# Patient Record
Sex: MALE | Race: WHITE | NOT HISPANIC OR LATINO
[De-identification: names, ages, dates, MRNs, and addresses within clinical notes are randomized per-mention and may not be internally consistent; named-entity substitution may affect disease eponyms.]

---

## 2020-02-10 ENCOUNTER — APPOINTMENT (OUTPATIENT)
Dept: OPHTHALMOLOGY | Facility: CLINIC | Age: 70
End: 2020-02-10
Payer: MEDICARE

## 2020-02-10 ENCOUNTER — NON-APPOINTMENT (OUTPATIENT)
Age: 70
End: 2020-02-10

## 2020-02-10 PROCEDURE — 92012 INTRM OPH EXAM EST PATIENT: CPT

## 2020-02-10 PROCEDURE — 92083 EXTENDED VISUAL FIELD XM: CPT

## 2020-02-10 PROCEDURE — 92015 DETERMINE REFRACTIVE STATE: CPT

## 2020-06-16 ENCOUNTER — NON-APPOINTMENT (OUTPATIENT)
Age: 70
End: 2020-06-16

## 2020-06-16 ENCOUNTER — APPOINTMENT (OUTPATIENT)
Dept: OPHTHALMOLOGY | Facility: CLINIC | Age: 70
End: 2020-06-16
Payer: MEDICARE

## 2020-06-16 PROCEDURE — 92014 COMPRE OPH EXAM EST PT 1/>: CPT

## 2020-06-16 PROCEDURE — 92202 OPSCPY EXTND ON/MAC DRAW: CPT

## 2020-06-16 PROCEDURE — 92133 CPTRZD OPH DX IMG PST SGM ON: CPT

## 2020-12-18 ENCOUNTER — NON-APPOINTMENT (OUTPATIENT)
Age: 70
End: 2020-12-18

## 2020-12-18 ENCOUNTER — APPOINTMENT (OUTPATIENT)
Dept: OPHTHALMOLOGY | Facility: CLINIC | Age: 70
End: 2020-12-18
Payer: MEDICARE

## 2020-12-18 PROCEDURE — 92012 INTRM OPH EXAM EST PATIENT: CPT

## 2020-12-18 PROCEDURE — 92083 EXTENDED VISUAL FIELD XM: CPT

## 2021-06-18 ENCOUNTER — APPOINTMENT (OUTPATIENT)
Dept: OPHTHALMOLOGY | Facility: CLINIC | Age: 71
End: 2021-06-18
Payer: MEDICARE

## 2021-06-18 ENCOUNTER — NON-APPOINTMENT (OUTPATIENT)
Age: 71
End: 2021-06-18

## 2021-06-18 PROCEDURE — 92014 COMPRE OPH EXAM EST PT 1/>: CPT

## 2021-06-18 PROCEDURE — 92133 CPTRZD OPH DX IMG PST SGM ON: CPT

## 2021-06-18 PROCEDURE — 92202 OPSCPY EXTND ON/MAC DRAW: CPT

## 2022-01-14 ENCOUNTER — NON-APPOINTMENT (OUTPATIENT)
Age: 72
End: 2022-01-14

## 2022-01-14 ENCOUNTER — APPOINTMENT (OUTPATIENT)
Dept: OPHTHALMOLOGY | Facility: CLINIC | Age: 72
End: 2022-01-14
Payer: MEDICARE

## 2022-01-14 PROCEDURE — 92250 FUNDUS PHOTOGRAPHY W/I&R: CPT

## 2022-01-14 PROCEDURE — 92083 EXTENDED VISUAL FIELD XM: CPT

## 2022-01-14 PROCEDURE — 92014 COMPRE OPH EXAM EST PT 1/>: CPT

## 2022-07-15 ENCOUNTER — NON-APPOINTMENT (OUTPATIENT)
Age: 72
End: 2022-07-15

## 2022-07-15 ENCOUNTER — APPOINTMENT (OUTPATIENT)
Dept: OPHTHALMOLOGY | Facility: CLINIC | Age: 72
End: 2022-07-15

## 2022-07-15 PROCEDURE — 92202 OPSCPY EXTND ON/MAC DRAW: CPT

## 2022-07-15 PROCEDURE — 92133 CPTRZD OPH DX IMG PST SGM ON: CPT

## 2022-07-15 PROCEDURE — 92015 DETERMINE REFRACTIVE STATE: CPT

## 2022-07-15 PROCEDURE — 92014 COMPRE OPH EXAM EST PT 1/>: CPT

## 2022-08-26 ENCOUNTER — APPOINTMENT (OUTPATIENT)
Dept: ORTHOPEDIC SURGERY | Facility: CLINIC | Age: 72
End: 2022-08-26

## 2023-01-20 ENCOUNTER — NON-APPOINTMENT (OUTPATIENT)
Age: 73
End: 2023-01-20

## 2023-01-20 ENCOUNTER — APPOINTMENT (OUTPATIENT)
Dept: OPHTHALMOLOGY | Facility: CLINIC | Age: 73
End: 2023-01-20
Payer: MEDICARE

## 2023-01-20 PROCEDURE — 92083 EXTENDED VISUAL FIELD XM: CPT

## 2023-01-20 PROCEDURE — 92134 CPTRZ OPH DX IMG PST SGM RTA: CPT

## 2023-01-20 PROCEDURE — 92012 INTRM OPH EXAM EST PATIENT: CPT

## 2023-06-24 ENCOUNTER — NON-APPOINTMENT (OUTPATIENT)
Age: 73
End: 2023-06-24

## 2023-06-28 ENCOUNTER — APPOINTMENT (OUTPATIENT)
Dept: ORTHOPEDIC SURGERY | Facility: CLINIC | Age: 73
End: 2023-06-28
Payer: MEDICARE

## 2023-06-28 VITALS — HEIGHT: 69 IN | BODY MASS INDEX: 32.44 KG/M2 | WEIGHT: 219 LBS

## 2023-06-28 DIAGNOSIS — M17.11 UNILATERAL PRIMARY OSTEOARTHRITIS, RIGHT KNEE: ICD-10-CM

## 2023-06-28 PROCEDURE — 99204 OFFICE O/P NEW MOD 45 MIN: CPT

## 2023-06-28 PROCEDURE — 73564 X-RAY EXAM KNEE 4 OR MORE: CPT | Mod: RT

## 2023-07-03 NOTE — ADDENDUM
[FreeTextEntry1] : I, Hiral Saha, acted solely as a scribe for Dr. Zafar Gimenez MD on this date 06/28/2023 .\par \par All medical record entries made by the Scribe were at my, Dr. Zafar Gimenez MD , direction and personally dictated by me on 06/28/2023 . I have reviewed the chart and agree that the record accurately reflects my personal performance of the history, physical exam, assessment and plan. I have also personally directed, reviewed, and agreed with the chart.\par

## 2023-07-03 NOTE — CONSULT LETTER
[Dear  ___] : Dear  [unfilled], [Sincerely,] : Sincerely, [FreeTextEntry3] : Dr. Bertin Gimenez\par [facility]\par [address]\[place] [address] \par [phone]\par

## 2023-07-03 NOTE — HISTORY OF PRESENT ILLNESS
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 Spontaneous onset 1 year ago diffused popliteal swelling and tightness and anterior peripatellar pain. Provocation of pain with walking. Notes occasional intermittent swelling and giving out. Patient presents today ambulating independently. Patient reports they are going away early July and then again in late August\par \par Of note, surgical history of meniscus operation in 2009/2010. \par

## 2023-07-03 NOTE — PHYSICAL EXAM
[de-identified] : Constitutional: Well nourished , well developed and in no acute distress\par Psychiatric: Alert and oriented to time place and person.Appropriate affect .\par Skin: Head, neck, arms and lower extremities:no lesions or discoloration\par HEENT: Normocephalic, EOM intact, Nasal septum midline,\par Respiratory: Unlabored respirations,no audible wheezing ,no tachypnea, no cyanosis\par Cardiovascular: No leg swelling no ankle edema no JVD, pulse regular\par Vascular: No calf or thigh tenderness,\par Peripheral pulses: intact\par Lymphatics: No groin adenopathy,no lymphedema lower or upper extremities\par \par Right Knee Exam:\par Inspection/Palpation : no tenderness to palpation, effusion 1+, no deformity\par Range of Motion : 5 - 115 degrees, crepitus, \par Stability : no valgus or varus instability present on provocative testing, Lachman’s Test (-)\par Motor Strength: Peroneals, EHL, and tibialis anterior 5/5\par Reflexes: Patella 2+ R=L, Achilles 2+ R=L\par Sensation : sensation to pin intact\par Vascular Exam : no edema, no cyanosis, dorsalis pedis artery pulse 1+, posterior tibial artery pulse 2+\par Skin : no erythema, no ecchymosis\par Additional tests: Right Hip Range of motion  satisfactory pain free [de-identified] : 4 views of the right  knee obtained today 06/28/2023 demonstrates degenerative narrowing medial compartment bone on bone opposition, subchondral sclerosis, valgus alignment

## 2023-07-03 NOTE — DISCUSSION/SUMMARY
[de-identified] : 72 year old presents with end stage RIGHT knee osteoarthritis with debilitating RIGHT knee pain that is unresponsive to comprehensive conservative management and compromising quality of life. The conditions and treatment options have been discussed with the patient. Given Xrays, failure of prior conservative treatment including physical therapy, NSAIDs, cortisone injections and persistent pain at rest, the patient understands that I recommend total knee replacement as their best option for long term pain relief. We discussed total knee replacement, including risks, benefits and alternatives, and patient would like to proceed with surgery. I reviewed the plan of care as well as a model of a knee implant equivalent to the one that will be used for their total knee joint replacement. The patient understands the risks and agrees to the plan of care as well as the use of implants for their total knee replacement.\par \par Patient will be scheduled for RIGHT total knee replacement.\par \par Dr. Zafar Gimenez MD

## 2023-07-19 ENCOUNTER — APPOINTMENT (OUTPATIENT)
Dept: INTERNAL MEDICINE | Facility: CLINIC | Age: 73
End: 2023-07-19
Payer: MEDICARE

## 2023-07-19 ENCOUNTER — NON-APPOINTMENT (OUTPATIENT)
Age: 73
End: 2023-07-19

## 2023-07-19 VITALS — DIASTOLIC BLOOD PRESSURE: 78 MMHG | SYSTOLIC BLOOD PRESSURE: 128 MMHG

## 2023-07-19 VITALS
HEIGHT: 69 IN | WEIGHT: 223 LBS | DIASTOLIC BLOOD PRESSURE: 80 MMHG | OXYGEN SATURATION: 97 % | HEART RATE: 90 BPM | BODY MASS INDEX: 33.03 KG/M2 | SYSTOLIC BLOOD PRESSURE: 153 MMHG

## 2023-07-19 DIAGNOSIS — R61 GENERALIZED HYPERHIDROSIS: ICD-10-CM

## 2023-07-19 DIAGNOSIS — Z00.00 ENCOUNTER FOR GENERAL ADULT MEDICAL EXAMINATION W/OUT ABNORMAL FINDINGS: ICD-10-CM

## 2023-07-19 PROCEDURE — 36415 COLL VENOUS BLD VENIPUNCTURE: CPT

## 2023-07-19 PROCEDURE — G0444 DEPRESSION SCREEN ANNUAL: CPT | Mod: 59

## 2023-07-19 PROCEDURE — G0439: CPT

## 2023-07-19 NOTE — PHYSICAL EXAM
[No Acute Distress] : no acute distress [Well Nourished] : well nourished [PERRL] : pupils equal round and reactive to light [EOMI] : extraocular movements intact [Normal Oropharynx] : the oropharynx was normal [Supple] : supple [No Respiratory Distress] : no respiratory distress  [Clear to Auscultation] : lungs were clear to auscultation bilaterally [Normal S1, S2] : normal S1 and S2 [No Murmur] : no murmur heard [No Edema] : there was no peripheral edema [Soft] : abdomen soft [Non Tender] : non-tender [Normal Bowel Sounds] : normal bowel sounds [No Rash] : no rash [No Focal Deficits] : no focal deficits [de-identified] : obese [de-identified] : right knee swelling

## 2023-07-19 NOTE — HEALTH RISK ASSESSMENT
[Excellent] : ~his/her~  mood as  excellent [Yes] : Yes [2 - 3 times a week (3 pts)] : 2 - 3  times a week (3 points) [1 or 2 (0 pts)] : 1 or 2 (0 points) [Never (0 pts)] : Never (0 points) [No] : In the past 12 months have you used drugs other than those required for medical reasons? No [No falls in past year] : Patient reported no falls in the past year [0] : 2) Feeling down, depressed, or hopeless: Not at all (0) [Patient reported colonoscopy was normal] : Patient reported colonoscopy was normal [With Significant Other] : lives with significant other [Retired] : retired [] :  [Fully functional (bathing, dressing, toileting, transferring, walking, feeding)] : Fully functional (bathing, dressing, toileting, transferring, walking, feeding) [Fully functional (using the telephone, shopping, preparing meals, housekeeping, doing laundry, using] : Fully functional and needs no help or supervision to perform IADLs (using the telephone, shopping, preparing meals, housekeeping, doing laundry, using transportation, managing medications and managing finances) [Former] : Former [< 15 Years] : < 15 Years [de-identified] : riding bike daily  [HIV3Hpguw] : 0 [Change in mental status noted] : No change in mental status noted [Language] : denies difficulty with language [Behavior] : denies difficulty with behavior [Learning/Retaining New Information] : denies difficulty learning/retaining new information [Handling Complex Tasks] : denies difficulty handling complex tasks [Reasoning] : denies difficulty with reasoning [Spatial Ability and Orientation] : denies difficulty with spatial ability and orientation [High Risk Behavior] : no high risk behavior

## 2023-07-19 NOTE — REVIEW OF SYSTEMS
[Sore Throat] : sore throat [Cough] : cough [Joint Pain] : joint pain [Joint Stiffness] : joint stiffness [Negative] : Respiratory

## 2023-07-21 ENCOUNTER — APPOINTMENT (OUTPATIENT)
Dept: OPHTHALMOLOGY | Facility: CLINIC | Age: 73
End: 2023-07-21

## 2023-07-25 LAB
ALBUMIN SERPL ELPH-MCNC: 4.6 G/DL
ALP BLD-CCNC: 93 U/L
ALT SERPL-CCNC: 18 U/L
ANION GAP SERPL CALC-SCNC: 13 MMOL/L
AST SERPL-CCNC: 19 U/L
BILIRUB SERPL-MCNC: 0.2 MG/DL
BUN SERPL-MCNC: 19 MG/DL
CALCIUM SERPL-MCNC: 9.9 MG/DL
CHLORIDE SERPL-SCNC: 104 MMOL/L
CHOLEST SERPL-MCNC: 165 MG/DL
CO2 SERPL-SCNC: 24 MMOL/L
CREAT SERPL-MCNC: 0.88 MG/DL
EGFR: 92 ML/MIN/1.73M2
ESTIMATED AVERAGE GLUCOSE: 123 MG/DL
GLUCOSE SERPL-MCNC: 111 MG/DL
HBA1C MFR BLD HPLC: 5.9 %
HDLC SERPL-MCNC: 53 MG/DL
LDLC SERPL CALC-MCNC: 74 MG/DL
NONHDLC SERPL-MCNC: 111 MG/DL
POTASSIUM SERPL-SCNC: 4.1 MMOL/L
PROT SERPL-MCNC: 7.2 G/DL
SODIUM SERPL-SCNC: 140 MMOL/L
TRIGL SERPL-MCNC: 227 MG/DL
TSH SERPL-ACNC: 1.12 UIU/ML

## 2023-08-29 ENCOUNTER — OUTPATIENT (OUTPATIENT)
Dept: OUTPATIENT SERVICES | Facility: HOSPITAL | Age: 73
LOS: 1 days | End: 2023-08-29

## 2023-08-29 VITALS
OXYGEN SATURATION: 97 % | DIASTOLIC BLOOD PRESSURE: 85 MMHG | TEMPERATURE: 97 F | SYSTOLIC BLOOD PRESSURE: 172 MMHG | WEIGHT: 227.08 LBS | HEART RATE: 88 BPM | RESPIRATION RATE: 16 BRPM | HEIGHT: 68.5 IN

## 2023-08-29 DIAGNOSIS — M17.11 UNILATERAL PRIMARY OSTEOARTHRITIS, RIGHT KNEE: ICD-10-CM

## 2023-08-29 DIAGNOSIS — Z98.890 OTHER SPECIFIED POSTPROCEDURAL STATES: Chronic | ICD-10-CM

## 2023-08-29 DIAGNOSIS — Z98.1 ARTHRODESIS STATUS: Chronic | ICD-10-CM

## 2023-08-29 LAB
A1C WITH ESTIMATED AVERAGE GLUCOSE RESULT: 5.8 % — HIGH (ref 4–5.6)
ALBUMIN SERPL ELPH-MCNC: 4.7 G/DL — SIGNIFICANT CHANGE UP (ref 3.3–5)
ALP SERPL-CCNC: 82 U/L — SIGNIFICANT CHANGE UP (ref 30–120)
ALT FLD-CCNC: 47 U/L — SIGNIFICANT CHANGE UP (ref 10–60)
ANION GAP SERPL CALC-SCNC: 7 MMOL/L — SIGNIFICANT CHANGE UP (ref 5–17)
APTT BLD: 31.4 SEC — SIGNIFICANT CHANGE UP (ref 24.5–35.6)
AST SERPL-CCNC: 31 U/L — SIGNIFICANT CHANGE UP (ref 10–40)
BILIRUB SERPL-MCNC: 0.5 MG/DL — SIGNIFICANT CHANGE UP (ref 0.2–1.2)
BLD GP AB SCN SERPL QL: SIGNIFICANT CHANGE UP
BUN SERPL-MCNC: 22 MG/DL — SIGNIFICANT CHANGE UP (ref 7–23)
CALCIUM SERPL-MCNC: 9.5 MG/DL — SIGNIFICANT CHANGE UP (ref 8.4–10.5)
CHLORIDE SERPL-SCNC: 104 MMOL/L — SIGNIFICANT CHANGE UP (ref 96–108)
CO2 SERPL-SCNC: 29 MMOL/L — SIGNIFICANT CHANGE UP (ref 22–31)
CREAT SERPL-MCNC: 0.89 MG/DL — SIGNIFICANT CHANGE UP (ref 0.5–1.3)
EGFR: 91 ML/MIN/1.73M2 — SIGNIFICANT CHANGE UP
ESTIMATED AVERAGE GLUCOSE: 120 MG/DL — HIGH (ref 68–114)
GLUCOSE SERPL-MCNC: 90 MG/DL — SIGNIFICANT CHANGE UP (ref 70–99)
HCT VFR BLD CALC: 44.1 % — SIGNIFICANT CHANGE UP (ref 39–50)
HGB BLD-MCNC: 13.9 G/DL — SIGNIFICANT CHANGE UP (ref 13–17)
INR BLD: 0.99 RATIO — SIGNIFICANT CHANGE UP (ref 0.85–1.18)
MCHC RBC-ENTMCNC: 31.3 PG — SIGNIFICANT CHANGE UP (ref 27–34)
MCHC RBC-ENTMCNC: 31.5 GM/DL — LOW (ref 32–36)
MCV RBC AUTO: 99.3 FL — SIGNIFICANT CHANGE UP (ref 80–100)
MRSA PCR RESULT.: SIGNIFICANT CHANGE UP
NRBC # BLD: 0 /100 WBCS — SIGNIFICANT CHANGE UP (ref 0–0)
PLATELET # BLD AUTO: 290 K/UL — SIGNIFICANT CHANGE UP (ref 150–400)
POTASSIUM SERPL-MCNC: 4.3 MMOL/L — SIGNIFICANT CHANGE UP (ref 3.5–5.3)
POTASSIUM SERPL-SCNC: 4.3 MMOL/L — SIGNIFICANT CHANGE UP (ref 3.5–5.3)
PROT SERPL-MCNC: 8.1 G/DL — SIGNIFICANT CHANGE UP (ref 6–8.3)
PROTHROM AB SERPL-ACNC: 11.1 SEC — SIGNIFICANT CHANGE UP (ref 9.5–13)
RBC # BLD: 4.44 M/UL — SIGNIFICANT CHANGE UP (ref 4.2–5.8)
RBC # FLD: 12.6 % — SIGNIFICANT CHANGE UP (ref 10.3–14.5)
S AUREUS DNA NOSE QL NAA+PROBE: SIGNIFICANT CHANGE UP
SODIUM SERPL-SCNC: 140 MMOL/L — SIGNIFICANT CHANGE UP (ref 135–145)
WBC # BLD: 8.37 K/UL — SIGNIFICANT CHANGE UP (ref 3.8–10.5)
WBC # FLD AUTO: 8.37 K/UL — SIGNIFICANT CHANGE UP (ref 3.8–10.5)

## 2023-08-29 NOTE — H&P PST ADULT - COMMENTS
history of covid December 2021, mild symptoms and no treatment  denies any current cold or flu like symptoms, including fever, cough, sinus congestion, body aches or chills  lumbar fusion L5-S1, 1971 history of covid December 2021, mild symptoms and no treatment  denies any current cold or flu like symptoms, including fever, cough, sinus congestion, body aches or chills  lumbar fusion L5-S1, 1971  full upper and partial lower denture

## 2023-08-29 NOTE — H&P PST ADULT - NSICDXFAMILYHX_GEN_ALL_CORE_FT
FAMILY HISTORY:  Father  Still living? No  Family history of obesity, Age at diagnosis: Age Unknown  Family history of type 2 diabetes mellitus, Age at diagnosis: Age Unknown    Mother  Still living? No  Family history of breast cancer, Age at diagnosis: Age Unknown    Sibling  Still living? Yes, Estimated age: 81-90  Family history of colon cancer, Age at diagnosis: Age Unknown

## 2023-08-29 NOTE — H&P PST ADULT - MUSCULOSKELETAL
right knee/no joint erythema/no joint warmth/no calf tenderness/decreased ROM/decreased ROM due to pain/joint swelling/strength 5/5 bilateral upper extremities/decreased strength/abnormal gait details…

## 2023-08-29 NOTE — H&P PST ADULT - NSANTHOSAYNRD_GEN_A_CORE
neck 17.5 inches/No. MIRYAM screening performed.  STOP BANG Legend: 0-2 = LOW Risk; 3-4 = INTERMEDIATE Risk; 5-8 = HIGH Risk

## 2023-08-29 NOTE — H&P PST ADULT - HISTORY OF PRESENT ILLNESS
72 yo male presents with 1 year history of right knee pain. He was treated in the past with "gel shots" with only 2-4 days of relief. Pain now 5/10 at rest and increased to 10/10 with activity. Also reports swelling and stiffness. Pain relieved with withrest and tylenol. Now uses a cane and wears a knee brace for ambulation

## 2023-08-29 NOTE — H&P PST ADULT - NSICDXPASTMEDICALHX_GEN_ALL_CORE_FT
PAST MEDICAL HISTORY:  COVID-19 vaccine series completed     Dyslipidemia     Dyspnea on exertion     History of COVID-19     History of nocturia     Osteoarthritis     Primary osteoarthritis of right knee

## 2023-08-29 NOTE — H&P PST ADULT - NSICDXPASTSURGICALHX_GEN_ALL_CORE_FT
PAST SURGICAL HISTORY:  History of arthroscopy of left knee     History of arthroscopy of right knee     History of lumbar spinal fusion     History of repair of left rotator cuff

## 2023-08-29 NOTE — H&P PST ADULT - PROBLEM SELECTOR PLAN 1
right TKR. medical and cardiac clearances requested. obtain EKG, echo and stress reports. instructed to stop 1 week prior to surgery. instructed to stop turmeric, curcumin and glucosamine/chondroitin 1 week prior to surgery. gatorade and surgical wash instructions reviewed and verbalized understanding.

## 2023-08-30 PROBLEM — Z86.16 PERSONAL HISTORY OF COVID-19: Chronic | Status: ACTIVE | Noted: 2023-08-29

## 2023-08-30 PROBLEM — M19.90 UNSPECIFIED OSTEOARTHRITIS, UNSPECIFIED SITE: Chronic | Status: ACTIVE | Noted: 2023-08-29

## 2023-08-30 PROBLEM — R06.09 OTHER FORMS OF DYSPNEA: Chronic | Status: ACTIVE | Noted: 2023-08-29

## 2023-08-30 PROBLEM — Z92.29 PERSONAL HISTORY OF OTHER DRUG THERAPY: Chronic | Status: ACTIVE | Noted: 2023-08-29

## 2023-08-30 PROBLEM — E78.5 HYPERLIPIDEMIA, UNSPECIFIED: Chronic | Status: ACTIVE | Noted: 2023-08-29

## 2023-08-30 PROBLEM — Z87.898 PERSONAL HISTORY OF OTHER SPECIFIED CONDITIONS: Chronic | Status: ACTIVE | Noted: 2023-08-29

## 2023-08-30 PROBLEM — M17.11 UNILATERAL PRIMARY OSTEOARTHRITIS, RIGHT KNEE: Chronic | Status: ACTIVE | Noted: 2023-08-29

## 2023-08-31 ENCOUNTER — APPOINTMENT (OUTPATIENT)
Dept: INTERNAL MEDICINE | Facility: CLINIC | Age: 73
End: 2023-08-31
Payer: MEDICARE

## 2023-08-31 VITALS — SYSTOLIC BLOOD PRESSURE: 142 MMHG | DIASTOLIC BLOOD PRESSURE: 80 MMHG

## 2023-08-31 VITALS — BODY MASS INDEX: 32.88 KG/M2 | HEART RATE: 81 BPM | HEIGHT: 69 IN | WEIGHT: 222 LBS | OXYGEN SATURATION: 98 %

## 2023-08-31 DIAGNOSIS — Z01.818 ENCOUNTER FOR OTHER PREPROCEDURAL EXAMINATION: ICD-10-CM

## 2023-08-31 PROCEDURE — 99214 OFFICE O/P EST MOD 30 MIN: CPT

## 2023-08-31 NOTE — ASSESSMENT
[Patient Optimized for Surgery] : Patient optimized for surgery [No Further Testing Recommended] : no further testing recommended [FreeTextEntry4] : Pt has been medically optimized by cardiology PST including EKG in cardiologist's office are all stable

## 2023-08-31 NOTE — HISTORY OF PRESENT ILLNESS
[No Pertinent Pulmonary History] : no history of asthma, COPD, sleep apnea, or smoking [No Adverse Anesthesia Reaction] : no adverse anesthesia reaction in self or family member [Chronic Anticoagulation] : no chronic anticoagulation [Chronic Kidney Disease] : no chronic kidney disease [Diabetes] : no diabetes [(Patient denies any chest pain, claudication, dyspnea on exertion, orthopnea, palpitations or syncope)] : Patient denies any chest pain, claudication, dyspnea on exertion, orthopnea, palpitations or syncope [Moderate (4-6 METs)] : Moderate (4-6 METs) [FreeTextEntry1] : r knee replacement [FreeTextEntry2] : 9/15/2023 [FreeTextEntry3] : Dr Bertin Gimenez [FreeTextEntry4] : Pt has a history of primary OA of r knee. he will be going in for replacement on 9/15 [FreeTextEntry5] : h/o aortic valve insufficiency

## 2023-09-06 RX ORDER — OXYCODONE HYDROCHLORIDE 5 MG/1
5 TABLET ORAL
Refills: 0 | Status: DISCONTINUED | OUTPATIENT
Start: 2023-09-14 | End: 2023-09-16

## 2023-09-06 RX ORDER — ONDANSETRON 8 MG/1
4 TABLET, FILM COATED ORAL EVERY 6 HOURS
Refills: 0 | Status: DISCONTINUED | OUTPATIENT
Start: 2023-09-14 | End: 2023-09-16

## 2023-09-06 RX ORDER — CELECOXIB 200 MG/1
200 CAPSULE ORAL EVERY 12 HOURS
Refills: 0 | Status: DISCONTINUED | OUTPATIENT
Start: 2023-09-14 | End: 2023-09-16

## 2023-09-06 RX ORDER — SODIUM CHLORIDE 9 MG/ML
1000 INJECTION, SOLUTION INTRAVENOUS
Refills: 0 | Status: DISCONTINUED | OUTPATIENT
Start: 2023-09-14 | End: 2023-09-16

## 2023-09-06 RX ORDER — MAGNESIUM HYDROXIDE 400 MG/1
30 TABLET, CHEWABLE ORAL DAILY
Refills: 0 | Status: DISCONTINUED | OUTPATIENT
Start: 2023-09-14 | End: 2023-09-16

## 2023-09-06 RX ORDER — OXYCODONE HYDROCHLORIDE 5 MG/1
10 TABLET ORAL
Refills: 0 | Status: DISCONTINUED | OUTPATIENT
Start: 2023-09-14 | End: 2023-09-16

## 2023-09-06 RX ORDER — SODIUM CHLORIDE 9 MG/ML
500 INJECTION INTRAMUSCULAR; INTRAVENOUS; SUBCUTANEOUS ONCE
Refills: 0 | Status: COMPLETED | OUTPATIENT
Start: 2023-09-14 | End: 2023-09-14

## 2023-09-06 RX ORDER — ACETAMINOPHEN 500 MG
1000 TABLET ORAL EVERY 8 HOURS
Refills: 0 | Status: DISCONTINUED | OUTPATIENT
Start: 2023-09-14 | End: 2023-09-16

## 2023-09-06 RX ORDER — PANTOPRAZOLE SODIUM 20 MG/1
40 TABLET, DELAYED RELEASE ORAL
Refills: 0 | Status: DISCONTINUED | OUTPATIENT
Start: 2023-09-14 | End: 2023-09-16

## 2023-09-06 RX ORDER — SENNA PLUS 8.6 MG/1
2 TABLET ORAL AT BEDTIME
Refills: 0 | Status: DISCONTINUED | OUTPATIENT
Start: 2023-09-14 | End: 2023-09-16

## 2023-09-06 RX ORDER — POLYETHYLENE GLYCOL 3350 17 G/17G
17 POWDER, FOR SOLUTION ORAL AT BEDTIME
Refills: 0 | Status: DISCONTINUED | OUTPATIENT
Start: 2023-09-14 | End: 2023-09-16

## 2023-09-06 RX ORDER — HYDROMORPHONE HYDROCHLORIDE 2 MG/ML
0.5 INJECTION INTRAMUSCULAR; INTRAVENOUS; SUBCUTANEOUS
Refills: 0 | Status: DISCONTINUED | OUTPATIENT
Start: 2023-09-14 | End: 2023-09-16

## 2023-09-07 ENCOUNTER — OUTPATIENT (OUTPATIENT)
Dept: OUTPATIENT SERVICES | Facility: HOSPITAL | Age: 73
LOS: 1 days | End: 2023-09-07
Payer: MEDICARE

## 2023-09-07 ENCOUNTER — APPOINTMENT (OUTPATIENT)
Dept: CT IMAGING | Facility: CLINIC | Age: 73
End: 2023-09-07
Payer: MEDICARE

## 2023-09-07 DIAGNOSIS — Z98.890 OTHER SPECIFIED POSTPROCEDURAL STATES: Chronic | ICD-10-CM

## 2023-09-07 DIAGNOSIS — M17.11 UNILATERAL PRIMARY OSTEOARTHRITIS, RIGHT KNEE: ICD-10-CM

## 2023-09-07 DIAGNOSIS — Z98.1 ARTHRODESIS STATUS: Chronic | ICD-10-CM

## 2023-09-07 PROCEDURE — 73700 CT LOWER EXTREMITY W/O DYE: CPT

## 2023-09-07 PROCEDURE — 73700 CT LOWER EXTREMITY W/O DYE: CPT | Mod: 26,RT,MH

## 2023-09-08 ENCOUNTER — APPOINTMENT (OUTPATIENT)
Dept: ORTHOPEDIC SURGERY | Facility: CLINIC | Age: 73
End: 2023-09-08
Payer: MEDICARE

## 2023-09-08 DIAGNOSIS — M17.11 UNILATERAL PRIMARY OSTEOARTHRITIS, RIGHT KNEE: ICD-10-CM

## 2023-09-08 PROCEDURE — 99213 OFFICE O/P EST LOW 20 MIN: CPT

## 2023-09-08 NOTE — PHYSICAL EXAM
TRANSFER - OUT REPORT:     Verbal report given to: EDGAR RN. Report consisted of patient's Situation, Background, Assessment and   Recommendations(SBAR). Opportunity for questions and clarification was provided. Patient transported with a Registered Nurse. Patient transported to: holding area. [de-identified] : Constitutional: Well nourished , well developed and in no acute distress Psychiatric: Alert and oriented to time place and person.Appropriate affect . Skin: Head, neck, arms and lower extremities:no lesions or discoloration HEENT: Normocephalic, EOM intact, Nasal septum midline, Respiratory: Unlabored respirations,no audible wheezing ,no tachypnea, no cyanosis Cardiovascular: No leg swelling no ankle edema no JVD, pulse regular Vascular: No calf or thigh tenderness, Peripheral pulses: intact Lymphatics: No groin adenopathy,no lymphedema lower or upper extremities  Right Knee Exam :skin intact Inspection/Palpation : no tenderness to palpation, effusion 1+, no deformity Range of Motion : 5 - 115 degrees, crepitus,  Stability : no valgus or varus instability present on provocative testing, Lachman's Test (-) Motor Strength: Peroneals, EHL, and tibialis anterior 5/5 Reflexes: Patella 2+ R=L, Achilles 2+ R=L Sensation : sensation to pin intact Vascular Exam : no edema, no cyanosis, dorsalis pedis artery pulse 1+, posterior tibial artery pulse 2+ Skin : no erythema, no ecchymosis Additional tests: Right Hip Range of motion  satisfactory pain free [de-identified] : 4 views of the right  knee obtained today 06/28/2023 demonstrates degenerative narrowing medial compartment bone on bone opposition, subchondral sclerosis, valgus alignment

## 2023-09-08 NOTE — DISCUSSION/SUMMARY
[de-identified] : 72 year old presents with end stage RIGHT knee osteoarthritis with debilitating RIGHT knee pain that is unresponsive to comprehensive conservative management and compromising quality of life. The conditions and treatment options have been discussed with the patient. Given Xrays, failure of prior conservative treatment including physical therapy, NSAIDs, cortisone injections and persistent pain at rest, the patient understands that I recommend total knee replacement as their best option for long term pain relief. We discussed total knee replacement, including risks, benefits and alternatives, and patient would like to proceed with surgery. I reviewed the plan of care as well as a model of a knee implant equivalent to the one that will be used for their total knee joint replacement. The patient understands the risks and agrees to the plan of care as well as the use of implants for their total knee replacement.\par  \par  Patient will be scheduled for RIGHT total knee replacement.\par  \par  Dr. Zafar Gimenez MD

## 2023-09-08 NOTE — HISTORY OF PRESENT ILLNESS
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 Spontaneous onset 1 year ago diffused popliteal swelling and tightness and anterior peripatellar pain. Provocation of pain with walking. Notes occasional intermittent swelling and giving out. Patient presents today ambulating independently.  Of note, surgical history of meniscus operation in 2009/2010.  Follow-up skin check

## 2023-09-08 NOTE — ADDENDUM
[FreeTextEntry1] : I, Hiral Saha, acted solely as a scribe for Dr. Zafar Gimenez MD on this date 09/08/2023 .  All medical record entries made by the Scribe were at my, Dr. Zafar Gimenez MD , direction and personally dictated by me on 09/08/2023 . I have reviewed the chart and agree that the record accurately reflects my personal performance of the history, physical exam, assessment and plan. I have also personally directed, reviewed, and agreed with the chart.

## 2023-09-13 ENCOUNTER — TRANSCRIPTION ENCOUNTER (OUTPATIENT)
Age: 73
End: 2023-09-13

## 2023-09-14 ENCOUNTER — TRANSCRIPTION ENCOUNTER (OUTPATIENT)
Age: 73
End: 2023-09-14

## 2023-09-14 ENCOUNTER — APPOINTMENT (OUTPATIENT)
Dept: ORTHOPEDIC SURGERY | Facility: HOSPITAL | Age: 73
End: 2023-09-14

## 2023-09-14 ENCOUNTER — INPATIENT (INPATIENT)
Facility: HOSPITAL | Age: 73
LOS: 1 days | Discharge: HOME | DRG: 470 | End: 2023-09-16
Attending: ORTHOPAEDIC SURGERY | Admitting: ORTHOPAEDIC SURGERY
Payer: MEDICARE

## 2023-09-14 VITALS
SYSTOLIC BLOOD PRESSURE: 149 MMHG | OXYGEN SATURATION: 100 % | DIASTOLIC BLOOD PRESSURE: 69 MMHG | RESPIRATION RATE: 22 BRPM | WEIGHT: 213.85 LBS | HEART RATE: 78 BPM | HEIGHT: 69 IN

## 2023-09-14 DIAGNOSIS — M17.11 UNILATERAL PRIMARY OSTEOARTHRITIS, RIGHT KNEE: ICD-10-CM

## 2023-09-14 DIAGNOSIS — Z98.890 OTHER SPECIFIED POSTPROCEDURAL STATES: Chronic | ICD-10-CM

## 2023-09-14 DIAGNOSIS — Z98.1 ARTHRODESIS STATUS: Chronic | ICD-10-CM

## 2023-09-14 PROCEDURE — 27447 TOTAL KNEE ARTHROPLASTY: CPT | Mod: RT

## 2023-09-14 PROCEDURE — 73560 X-RAY EXAM OF KNEE 1 OR 2: CPT | Mod: 26,RT

## 2023-09-14 PROCEDURE — 99222 1ST HOSP IP/OBS MODERATE 55: CPT

## 2023-09-14 DEVICE — MAKO BONE PIN 4MM X 140MM: Type: IMPLANTABLE DEVICE | Status: FUNCTIONAL

## 2023-09-14 DEVICE — PATELLA TRIATHLON 40MM: Type: IMPLANTABLE DEVICE | Status: FUNCTIONAL

## 2023-09-14 DEVICE — COMP FEM CR CMNTLSS BEADED W/ PA SZ 8 RT: Type: IMPLANTABLE DEVICE | Status: FUNCTIONAL

## 2023-09-14 DEVICE — INSERT TIB BEARING CS X3 SZ 7 9MM: Type: IMPLANTABLE DEVICE | Status: FUNCTIONAL

## 2023-09-14 DEVICE — MAKO BONE PIN 4MM X 110MM: Type: IMPLANTABLE DEVICE | Status: FUNCTIONAL

## 2023-09-14 DEVICE — BASEPLATE TIB TRIATHLON TRITAN SZ 7: Type: IMPLANTABLE DEVICE | Status: FUNCTIONAL

## 2023-09-14 RX ORDER — MULTIVIT-MIN/FERROUS GLUCONATE 9 MG/15 ML
0 LIQUID (ML) ORAL
Refills: 0 | DISCHARGE

## 2023-09-14 RX ORDER — ACETAMINOPHEN 500 MG
1000 TABLET ORAL ONCE
Refills: 0 | Status: COMPLETED | OUTPATIENT
Start: 2023-09-14 | End: 2023-09-14

## 2023-09-14 RX ORDER — HYDROMORPHONE HYDROCHLORIDE 2 MG/ML
0.5 INJECTION INTRAMUSCULAR; INTRAVENOUS; SUBCUTANEOUS
Refills: 0 | Status: DISCONTINUED | OUTPATIENT
Start: 2023-09-14 | End: 2023-09-14

## 2023-09-14 RX ORDER — DEXAMETHASONE 0.5 MG/5ML
8 ELIXIR ORAL ONCE
Refills: 0 | Status: COMPLETED | OUTPATIENT
Start: 2023-09-15 | End: 2023-09-15

## 2023-09-14 RX ORDER — ASPIRIN/CALCIUM CARB/MAGNESIUM 324 MG
81 TABLET ORAL EVERY 12 HOURS
Refills: 0 | Status: DISCONTINUED | OUTPATIENT
Start: 2023-09-15 | End: 2023-09-16

## 2023-09-14 RX ORDER — MILK THISTLE 150 MG
1 CAPSULE ORAL
Refills: 0 | DISCHARGE

## 2023-09-14 RX ORDER — UBIDECARENONE 100 MG
1 CAPSULE ORAL
Refills: 0 | DISCHARGE

## 2023-09-14 RX ORDER — ATORVASTATIN CALCIUM 80 MG/1
20 TABLET, FILM COATED ORAL DAILY
Refills: 0 | Status: DISCONTINUED | OUTPATIENT
Start: 2023-09-14 | End: 2023-09-16

## 2023-09-14 RX ORDER — APREPITANT 80 MG/1
40 CAPSULE ORAL ONCE
Refills: 0 | Status: COMPLETED | OUTPATIENT
Start: 2023-09-14 | End: 2023-09-14

## 2023-09-14 RX ORDER — CEFAZOLIN SODIUM 1 G
2000 VIAL (EA) INJECTION EVERY 8 HOURS
Refills: 0 | Status: COMPLETED | OUTPATIENT
Start: 2023-09-14 | End: 2023-09-15

## 2023-09-14 RX ORDER — CHLORHEXIDINE GLUCONATE 213 G/1000ML
1 SOLUTION TOPICAL ONCE
Refills: 0 | Status: COMPLETED | OUTPATIENT
Start: 2023-09-14 | End: 2023-09-14

## 2023-09-14 RX ORDER — HYDROMORPHONE HYDROCHLORIDE 2 MG/ML
0.25 INJECTION INTRAMUSCULAR; INTRAVENOUS; SUBCUTANEOUS
Refills: 0 | Status: DISCONTINUED | OUTPATIENT
Start: 2023-09-14 | End: 2023-09-14

## 2023-09-14 RX ORDER — GLUCOSAMINE/CHONDROITIN/C/MANG 500-400 MG
3 CAPSULE ORAL
Refills: 0 | DISCHARGE

## 2023-09-14 RX ORDER — ONDANSETRON 8 MG/1
4 TABLET, FILM COATED ORAL ONCE
Refills: 0 | Status: DISCONTINUED | OUTPATIENT
Start: 2023-09-14 | End: 2023-09-14

## 2023-09-14 RX ORDER — CEFAZOLIN SODIUM 1 G
2000 VIAL (EA) INJECTION ONCE
Refills: 0 | Status: COMPLETED | OUTPATIENT
Start: 2023-09-14 | End: 2023-09-14

## 2023-09-14 RX ORDER — TRANEXAMIC ACID 100 MG/ML
1000 INJECTION, SOLUTION INTRAVENOUS ONCE
Refills: 0 | Status: COMPLETED | OUTPATIENT
Start: 2023-09-14 | End: 2023-09-14

## 2023-09-14 RX ORDER — SODIUM CHLORIDE 9 MG/ML
500 INJECTION INTRAMUSCULAR; INTRAVENOUS; SUBCUTANEOUS ONCE
Refills: 0 | Status: COMPLETED | OUTPATIENT
Start: 2023-09-14 | End: 2023-09-14

## 2023-09-14 RX ORDER — ATORVASTATIN CALCIUM 80 MG/1
1 TABLET, FILM COATED ORAL
Refills: 0 | DISCHARGE

## 2023-09-14 RX ORDER — INFLUENZA VIRUS VACCINE 15; 15; 15; 15 UG/.5ML; UG/.5ML; UG/.5ML; UG/.5ML
0.7 SUSPENSION INTRAMUSCULAR ONCE
Refills: 0 | Status: DISCONTINUED | OUTPATIENT
Start: 2023-09-14 | End: 2023-09-16

## 2023-09-14 RX ORDER — SODIUM CHLORIDE 9 MG/ML
1000 INJECTION, SOLUTION INTRAVENOUS
Refills: 0 | Status: DISCONTINUED | OUTPATIENT
Start: 2023-09-14 | End: 2023-09-14

## 2023-09-14 RX ADMIN — CELECOXIB 200 MILLIGRAM(S): 200 CAPSULE ORAL at 21:18

## 2023-09-14 RX ADMIN — Medication 1000 MILLIGRAM(S): at 22:55

## 2023-09-14 RX ADMIN — APREPITANT 40 MILLIGRAM(S): 80 CAPSULE ORAL at 08:48

## 2023-09-14 RX ADMIN — SODIUM CHLORIDE 500 MILLILITER(S): 9 INJECTION INTRAMUSCULAR; INTRAVENOUS; SUBCUTANEOUS at 18:10

## 2023-09-14 RX ADMIN — SENNA PLUS 2 TABLET(S): 8.6 TABLET ORAL at 21:17

## 2023-09-14 RX ADMIN — SODIUM CHLORIDE 500 MILLILITER(S): 9 INJECTION INTRAMUSCULAR; INTRAVENOUS; SUBCUTANEOUS at 16:33

## 2023-09-14 RX ADMIN — HYDROMORPHONE HYDROCHLORIDE 0.5 MILLIGRAM(S): 2 INJECTION INTRAMUSCULAR; INTRAVENOUS; SUBCUTANEOUS at 21:35

## 2023-09-14 RX ADMIN — Medication 100 MILLIGRAM(S): at 19:24

## 2023-09-14 RX ADMIN — Medication 400 MILLIGRAM(S): at 17:13

## 2023-09-14 RX ADMIN — HYDROMORPHONE HYDROCHLORIDE 0.5 MILLIGRAM(S): 2 INJECTION INTRAMUSCULAR; INTRAVENOUS; SUBCUTANEOUS at 18:10

## 2023-09-14 RX ADMIN — HYDROMORPHONE HYDROCHLORIDE 0.5 MILLIGRAM(S): 2 INJECTION INTRAMUSCULAR; INTRAVENOUS; SUBCUTANEOUS at 21:15

## 2023-09-14 RX ADMIN — OXYCODONE HYDROCHLORIDE 10 MILLIGRAM(S): 5 TABLET ORAL at 20:20

## 2023-09-14 RX ADMIN — ATORVASTATIN CALCIUM 20 MILLIGRAM(S): 80 TABLET, FILM COATED ORAL at 21:17

## 2023-09-14 RX ADMIN — Medication 1000 MILLIGRAM(S): at 17:13

## 2023-09-14 RX ADMIN — OXYCODONE HYDROCHLORIDE 10 MILLIGRAM(S): 5 TABLET ORAL at 23:38

## 2023-09-14 RX ADMIN — Medication 1000 MILLIGRAM(S): at 22:51

## 2023-09-14 RX ADMIN — SODIUM CHLORIDE 125 MILLILITER(S): 9 INJECTION, SOLUTION INTRAVENOUS at 19:24

## 2023-09-14 RX ADMIN — CELECOXIB 200 MILLIGRAM(S): 200 CAPSULE ORAL at 21:17

## 2023-09-14 RX ADMIN — OXYCODONE HYDROCHLORIDE 10 MILLIGRAM(S): 5 TABLET ORAL at 19:24

## 2023-09-14 RX ADMIN — OXYCODONE HYDROCHLORIDE 10 MILLIGRAM(S): 5 TABLET ORAL at 22:52

## 2023-09-14 RX ADMIN — CHLORHEXIDINE GLUCONATE 1 APPLICATION(S): 213 SOLUTION TOPICAL at 08:49

## 2023-09-14 RX ADMIN — HYDROMORPHONE HYDROCHLORIDE 0.5 MILLIGRAM(S): 2 INJECTION INTRAMUSCULAR; INTRAVENOUS; SUBCUTANEOUS at 18:11

## 2023-09-14 NOTE — PHYSICAL THERAPY INITIAL EVALUATION ADULT - GAIT TRAINING, PT EVAL
Patient will ambulate 150 feet independently with a rolling walker within 1-2 days for safe community ambulation. Patient will ascend/descend 5 stairs independently with +HR and straight cane within 1-2 days for safe household stair negotiation.

## 2023-09-14 NOTE — DISCHARGE NOTE PROVIDER - NSDCCPTREATMENT_GEN_ALL_CORE_FT
PRINCIPAL PROCEDURE  Procedure: Arthroplasty, knee, total, robot-assisted, using Isidro system  Findings and Treatment: right knee  osteoarthritis right knee

## 2023-09-14 NOTE — DISCHARGE NOTE PROVIDER - NSDCDCMDCOMP_GEN_ALL_CORE
Patient presents to clinic for annual PX and medication review.  Leah Morales LPN ....................  6/12/2018   8:23 AM    
This document is complete and the patient is ready for discharge.

## 2023-09-14 NOTE — PHYSICAL THERAPY INITIAL EVALUATION ADULT - ADDITIONAL COMMENTS
Pt lives with wife in a private home, there are 5 stairs +HR to enter and no stairs to navigate within. Pt has a tub shower. PTA pt was independent with ADLs and ambulation. Pt owns a RW.

## 2023-09-14 NOTE — DISCHARGE NOTE PROVIDER - NSDCFUSCHEDAPPT_GEN_ALL_CORE_FT
Bertin Gimenez  [facility]  ORTHOSURG [address]  Scheduled Appointment: 09/27/2023    Byron Joy  [facility]  OP[address]THALM [address]  Scheduled Appointment: 10/20/2023

## 2023-09-14 NOTE — DISCHARGE NOTE PROVIDER - INSTRUCTIONS
For Constipation :   • Increase your water intake. Drink at least 8 glasses of water daily.  • Try adding fiber to your diet by eating fruits, vegetables and foods that are rich in grains.  • If you do experience constipation, you may take an over-the-counter stool softener/laxative such as Terra Colace, Senekot, miralax or  Milk of Magnesia.

## 2023-09-14 NOTE — DISCHARGE NOTE PROVIDER - HOSPITAL COURSE
The patient is a 73  y.o.  male with severe osteoarthritis of the right knee.  He was evaluated by the PST dept at [facility] and obtained the appropriate medical clearances.  After admission on 9/14/23 and receiving pre-operative parenteral prophylactic antibiotics (ancef), the patient  underwent an  uncomplicated right TKA w/ SALUD guidance by orthopedic surgeon Dr. Bertin Gimenez.    A medical consultation from the Hospitalist service was obtained for post-operative medical co-management. Typical Physical & occupational therapy modalities post TKA were performed including ambulation training, range of motion, ADL's, and transfers. Ecotrin 81 mg every 12 hrs, along w/ bilat venodynes was given for DVT prophylaxis (caprini 8).  The patient had a clean appearing surgical incision with no sign of surgical site infections and had a stable neuro / vascular exam of the operated extremity.  After progression of mobility guided by the PT/ OT staff,  the patient was felt to benefit from further rehabilitative care for restoration to level of function. This was felt to best be accomplished at  home w/ home care (skilled RN/PT/OT).  Discharge and Orthopedic Care instructions were delineated in the Discharge Plan and reviewed with the patient. All medications were delineated in the medication reconciliation tool and key points were reviewed with the patient. They were deemed stable from an Orthopedic & medical standpoint for discharge today.  Upon completion of Home care  he will be  following up with Dr. Gimenez for office  follow up Orthopedic care.

## 2023-09-14 NOTE — DISCHARGE NOTE PROVIDER - PROVIDER TOKENS
PROVIDER:[TOKEN:[[id number]],SCHEDULEDAPPT:[09/27/2023],SCHEDULEDAPPTTIME:[08:30 AM],ESTABLISHEDPATIENT:[T]] 2 = assistive person

## 2023-09-14 NOTE — BRIEF OPERATIVE NOTE - NSICDXBRIEFPROCEDURE_GEN_ALL_CORE_FT
PROCEDURES:  Arthroplasty, knee, total, robot-assisted, using Isidro system 14-Sep-2023 14:24:02 right knee Radha Nichols

## 2023-09-14 NOTE — PATIENT PROFILE ADULT - FALL HARM RISK - UNIVERSAL INTERVENTIONS
Bed in lowest position, wheels locked, appropriate side rails in place/Call bell, personal items and telephone in reach/Instruct patient to call for assistance before getting out of bed or chair/Non-slip footwear when patient is out of bed/[place] to call system/Physically safe environment - no spills, clutter or unnecessary equipment/Purposeful Proactive Rounding/Room/bathroom lighting operational, light cord in reach

## 2023-09-14 NOTE — PHYSICAL THERAPY INITIAL EVALUATION ADULT - BALANCE DISTURBANCE, IDENTIFIED IMPAIRMENT CONTRIBUTE, REHAB EVAL
Patient is here today for irregular periods.  Her last pap showed: negative hpv negative done 7/17/2018             impaired motor control/decreased ROM/decreased strength

## 2023-09-14 NOTE — PHYSICAL THERAPY INITIAL EVALUATION ADULT - GAIT DEVIATIONS NOTED, PT EVAL
decreased jazmine/increased time in double stance/decreased step length/decreased weight-shifting ability

## 2023-09-14 NOTE — DISCHARGE NOTE PROVIDER - NSDCCPCAREPLAN_GEN_ALL_CORE_FT
PRINCIPAL DISCHARGE DIAGNOSIS  Diagnosis: Primary osteoarthritis of right knee  Assessment and Plan of Treatment: right TKA w/ SALUD guidance  Physical Therapy/Occupational Therapy for: ambulation, transfers, stairs, ADL's (activities of daily living), range of motion exercises, and isometrics  -Activity  • Weight Bearing as tolerated with rolling walker.  • Take short, frequent walks increasing the distance that you walk each day as tolerated.  • Change your position every hour to decrease pain and stiffness.  • Continue the exercises taught to you by your physical therapist.  • No driving until cleared by the doctor.  • No tub baths, hot tubs, or swimming pools until instructed by your doctor.  • Do not squat down on the floor.  • Do not kneel or twist your knee.  • Range of Motion Goals: Flexion= 120 degrees, Extension = 0 degrees  Keep incision clean and dry. You have a mepilex dressing. It is water resistant and may get slightly wet in the shower.  Remove dressing in 7 days and leave wound open to air.  Wound may get wet in the shower as long as there is no drainage from wound.  Suture removal 2 weeks after surgery at Surgeon's office.  Use cryocuff for 20 minute sessions w/ at least 1 hr between sessions.  Use a towel or washcloth under cuff; don't place directly on the skin.  Opioid safety :  Do not keep opioid in the medicine cabinet in bathroom where others may have access to it.  Do not drive while taking opioid.  To dispose of it some pharmacies do have drop boxes as do police stations.  Do not flush or put in trash.

## 2023-09-14 NOTE — CONSULT NOTE ADULT - ASSESSMENT
74 yo male, pmh of hld, OA, presenting for right knee replacement  - pain control, dvt ppx as per ortho  - bowel regimen  - ivf  - pt/ot  - monitor bp  - continue home statin  - will follow

## 2023-09-14 NOTE — DISCHARGE NOTE PROVIDER - NSDCMRMEDTOKEN_GEN_ALL_CORE_FT
atorvastatin 20 mg oral tablet: 1 orally once a day  CoQ10 100 mg oral capsule: 1 orally once a day  Glucosamine Chondroitin oral capsule: 3 orally 2 times a day  PreserVision AREDS 2 oral capsule: orally 2 times a day  turmeric 500 mg oral capsule: 1 orally 2 times a day  Tylenol 500 mg oral tablet: 2 orally 2 times a day as needed for  severe pain   acetaminophen 500 mg oral tablet: 2 tab(s) orally every 8 hours  aspirin 81 mg oral delayed release tablet: 1 tab(s) orally every 12 hours take 2 hrs before celebrex  atorvastatin 20 mg oral tablet: 1 orally once a day  celecoxib 200 mg oral capsule: 1 cap(s) orally every 12 hours take 2 hrs after ecotrin  CoQ10 100 mg oral capsule: 1 orally once a day  Glucosamine Chondroitin oral capsule: 3 orally 2 times a day  omeprazole 20 mg oral delayed release capsule: 1 cap(s) orally once a day before breakfast  oxyCODONE 5 mg oral tablet: 1 tab(s) orally every 4 hours as needed for  moderate pain or 2 tabs every 4 hrs as needed for severe pain   [id number] MDD: 6  polyethylene glycol 3350 oral powder for reconstitution: 17 gram(s) orally once a day (at bedtime)  PreserVision AREDS 2 oral capsule: orally 2 times a day  senna leaf extract oral tablet: 2 tab(s) orally once a day (at bedtime)  turmeric 500 mg oral capsule: 1 orally 2 times a day

## 2023-09-14 NOTE — DISCHARGE NOTE PROVIDER - CARE PROVIDER_API CALL
Bertin Gimenez  Orthopaedic Surgery  [address], [address]  [place] [address]  Phone: [phone]  Fax: [phone]  Established Patient  Scheduled Appointment: 09/27/2023 08:30 AM

## 2023-09-14 NOTE — CONSULT NOTE ADULT - SUBJECTIVE AND OBJECTIVE BOX
HPI:  74 yo male, pmh of hld, OA, presenting for right knee TKR. Denies current pain in pacu, still not feeling or able to feel feet due to spinal anesthesia. Denies dizziness, fever, chills, nausea, vomiting, headaches.    PAST MEDICAL & SURGICAL HISTORY:  Osteoarthritis      COVID-19 vaccine series completed      Primary osteoarthritis of right knee      Dyslipidemia      History of COVID-19      Dyspnea on exertion      History of nocturia      History of lumbar spinal fusion      History of repair of left rotator cuff      History of arthroscopy of right knee      History of arthroscopy of left knee          ANTIMICROBIAL:    CARDIOVASCULAR:    PULMONARY:    NEUROLOGIC:    ONCOLOGIC:    HEMATOLOGIC:    GATROINTESTINAL:     MEDS:    ENDO/METABOLIC:    IV FLUID/NUTRITION:  sodium chloride 0.9% Bolus 500 milliLiter(s) IV Bolus once    TOPICAL:    IMMUNOLOGIC & OTHER  influenza  Vaccine (HIGH DOSE) 0.7 milliLiter(s) IntraMuscular once        Allergies    shellfish (Anaphylaxis)  No Known Drug Allergies  fish (Anaphylaxis)    Intolerances      PAST MEDICAL & SURGICAL HISTORY:  Osteoarthritis      COVID-19 vaccine series completed      Primary osteoarthritis of right knee      Dyslipidemia      History of COVID-19      Dyspnea on exertion      History of nocturia      History of lumbar spinal fusion      History of repair of left rotator cuff      History of arthroscopy of right knee      History of arthroscopy of left knee        SOCIAL HISTORY:    FAMILY HISTORY:  Family history of type 2 diabetes mellitus (Father)    Family history of obesity (Father)    Family history of breast cancer (Mother)    Family history of colon cancer (Sibling)        Vital Signs Last 24 Hrs  T(C): 36.8 (14 Sep 2023 14:23), Max: 36.8 (14 Sep 2023 14:23)  T(F): 98.2 (14 Sep 2023 14:23), Max: 98.2 (14 Sep 2023 14:23)  HR: 76 (14 Sep 2023 14:53) (75 - 78)  BP: 135/69 (14 Sep 2023 14:53) (130/58 - 149/69)  BP(mean): --  RR: 15 (14 Sep 2023 14:53) (14 - 22)  SpO2: 95% (14 Sep 2023 14:53) (95% - 100%)    Parameters below as of 14 Sep 2023 14:53    O2 Flow (L/min): 3        I&O's Detail    Daily Height in cm: 175.26 (14 Sep 2023 07:57)    Daily     REVIEW OF SYSTEMS:    as per hpi, other systems reviewed and are negative    PHYSICAL EXAM:    GENERAL: NAD, older male  HEAD:  Atraumatic, Normocephalic  EYES: EOMI, PERRLA, conjunctiva and sclera clear  ENMT: No tonsillar erythema, exudates, or enlargement; Moist mucous membranes, Good dentition, No lesions  NECK: Supple, No JVD,   NERVOUS SYSTEM:  Alert & Oriented X3, no focal deficits  CHEST/LUNG: Clear to percussion bilaterally; No rales, rhonchi, wheezing, or rubs  HEART: Regular rate and rhythm; No murmurs, rubs, or gallops  ABDOMEN: Soft, Nontender, Nondistended; Bowel sounds present  EXTREMITIES:  1+ Peripheral Pulses, Right knee wrapped in ace bandage  LYMPH: No lymphadenopathy   SKIN: No rashes or lesions      LABS:                      RADIOLOGY & ADDITIONAL STUDIES:

## 2023-09-15 ENCOUNTER — TRANSCRIPTION ENCOUNTER (OUTPATIENT)
Age: 73
End: 2023-09-15

## 2023-09-15 LAB
ANION GAP SERPL CALC-SCNC: 11 MMOL/L — SIGNIFICANT CHANGE UP (ref 5–17)
BUN SERPL-MCNC: 18 MG/DL — SIGNIFICANT CHANGE UP (ref 7–23)
CALCIUM SERPL-MCNC: 9.5 MG/DL — SIGNIFICANT CHANGE UP (ref 8.4–10.5)
CHLORIDE SERPL-SCNC: 103 MMOL/L — SIGNIFICANT CHANGE UP (ref 96–108)
CO2 SERPL-SCNC: 22 MMOL/L — SIGNIFICANT CHANGE UP (ref 22–31)
CREAT SERPL-MCNC: 0.97 MG/DL — SIGNIFICANT CHANGE UP (ref 0.5–1.3)
EGFR: 83 ML/MIN/1.73M2 — SIGNIFICANT CHANGE UP
GLUCOSE SERPL-MCNC: 178 MG/DL — HIGH (ref 70–99)
HCT VFR BLD CALC: 33.4 % — LOW (ref 39–50)
HGB BLD-MCNC: 10.8 G/DL — LOW (ref 13–17)
MCHC RBC-ENTMCNC: 32 PG — SIGNIFICANT CHANGE UP (ref 27–34)
MCHC RBC-ENTMCNC: 32.3 GM/DL — SIGNIFICANT CHANGE UP (ref 32–36)
MCV RBC AUTO: 98.8 FL — SIGNIFICANT CHANGE UP (ref 80–100)
NRBC # BLD: 0 /100 WBCS — SIGNIFICANT CHANGE UP (ref 0–0)
PLATELET # BLD AUTO: 274 K/UL — SIGNIFICANT CHANGE UP (ref 150–400)
POTASSIUM SERPL-MCNC: 4.3 MMOL/L — SIGNIFICANT CHANGE UP (ref 3.5–5.3)
POTASSIUM SERPL-SCNC: 4.3 MMOL/L — SIGNIFICANT CHANGE UP (ref 3.5–5.3)
RBC # BLD: 3.38 M/UL — LOW (ref 4.2–5.8)
RBC # FLD: 12 % — SIGNIFICANT CHANGE UP (ref 10.3–14.5)
SODIUM SERPL-SCNC: 136 MMOL/L — SIGNIFICANT CHANGE UP (ref 135–145)
WBC # BLD: 16.18 K/UL — HIGH (ref 3.8–10.5)
WBC # FLD AUTO: 16.18 K/UL — HIGH (ref 3.8–10.5)

## 2023-09-15 PROCEDURE — 99232 SBSQ HOSP IP/OBS MODERATE 35: CPT

## 2023-09-15 RX ORDER — OMEPRAZOLE 10 MG/1
1 CAPSULE, DELAYED RELEASE ORAL
Qty: 30 | Refills: 0
Start: 2023-09-15 | End: 2023-10-14

## 2023-09-15 RX ORDER — SENNA PLUS 8.6 MG/1
2 TABLET ORAL
Qty: 0 | Refills: 0 | DISCHARGE
Start: 2023-09-15

## 2023-09-15 RX ORDER — CELECOXIB 200 MG/1
1 CAPSULE ORAL
Qty: 60 | Refills: 0
Start: 2023-09-15 | End: 2023-10-14

## 2023-09-15 RX ORDER — ACETAMINOPHEN 500 MG
2 TABLET ORAL
Refills: 0 | DISCHARGE

## 2023-09-15 RX ORDER — ACETAMINOPHEN 500 MG
2 TABLET ORAL
Qty: 0 | Refills: 0 | DISCHARGE
Start: 2023-09-15

## 2023-09-15 RX ORDER — ASPIRIN/CALCIUM CARB/MAGNESIUM 324 MG
1 TABLET ORAL
Qty: 56 | Refills: 0
Start: 2023-09-15 | End: 2023-10-12

## 2023-09-15 RX ORDER — OXYCODONE HYDROCHLORIDE 5 MG/1
1 TABLET ORAL
Qty: 42 | Refills: 0
Start: 2023-09-15

## 2023-09-15 RX ORDER — CELECOXIB 100 MG/1
1 CAPSULE ORAL
Qty: 60 | Refills: 0 | DISCHARGE
Start: 2023-09-15 | End: 2023-10-14

## 2023-09-15 RX ORDER — POLYETHYLENE GLYCOL 3350 17 G/17G
17 POWDER, FOR SOLUTION ORAL
Qty: 0 | Refills: 0 | DISCHARGE
Start: 2023-09-15

## 2023-09-15 RX ADMIN — Medication 81 MILLIGRAM(S): at 18:25

## 2023-09-15 RX ADMIN — CELECOXIB 200 MILLIGRAM(S): 200 CAPSULE ORAL at 21:02

## 2023-09-15 RX ADMIN — Medication 100 MILLIGRAM(S): at 03:17

## 2023-09-15 RX ADMIN — Medication 81 MILLIGRAM(S): at 05:46

## 2023-09-15 RX ADMIN — CELECOXIB 200 MILLIGRAM(S): 200 CAPSULE ORAL at 21:30

## 2023-09-15 RX ADMIN — HYDROMORPHONE HYDROCHLORIDE 0.5 MILLIGRAM(S): 2 INJECTION INTRAMUSCULAR; INTRAVENOUS; SUBCUTANEOUS at 11:26

## 2023-09-15 RX ADMIN — OXYCODONE HYDROCHLORIDE 10 MILLIGRAM(S): 5 TABLET ORAL at 21:30

## 2023-09-15 RX ADMIN — Medication 1000 MILLIGRAM(S): at 05:48

## 2023-09-15 RX ADMIN — HYDROMORPHONE HYDROCHLORIDE 0.5 MILLIGRAM(S): 2 INJECTION INTRAMUSCULAR; INTRAVENOUS; SUBCUTANEOUS at 11:11

## 2023-09-15 RX ADMIN — OXYCODONE HYDROCHLORIDE 10 MILLIGRAM(S): 5 TABLET ORAL at 13:29

## 2023-09-15 RX ADMIN — HYDROMORPHONE HYDROCHLORIDE 0.5 MILLIGRAM(S): 2 INJECTION INTRAMUSCULAR; INTRAVENOUS; SUBCUTANEOUS at 05:45

## 2023-09-15 RX ADMIN — OXYCODONE HYDROCHLORIDE 10 MILLIGRAM(S): 5 TABLET ORAL at 03:23

## 2023-09-15 RX ADMIN — Medication 1000 MILLIGRAM(S): at 15:00

## 2023-09-15 RX ADMIN — POLYETHYLENE GLYCOL 3350 17 GRAM(S): 17 POWDER, FOR SOLUTION ORAL at 21:03

## 2023-09-15 RX ADMIN — OXYCODONE HYDROCHLORIDE 10 MILLIGRAM(S): 5 TABLET ORAL at 09:27

## 2023-09-15 RX ADMIN — OXYCODONE HYDROCHLORIDE 10 MILLIGRAM(S): 5 TABLET ORAL at 21:02

## 2023-09-15 RX ADMIN — ATORVASTATIN CALCIUM 20 MILLIGRAM(S): 80 TABLET, FILM COATED ORAL at 12:21

## 2023-09-15 RX ADMIN — OXYCODONE HYDROCHLORIDE 10 MILLIGRAM(S): 5 TABLET ORAL at 08:57

## 2023-09-15 RX ADMIN — Medication 101.6 MILLIGRAM(S): at 05:46

## 2023-09-15 RX ADMIN — Medication 1000 MILLIGRAM(S): at 05:46

## 2023-09-15 RX ADMIN — SODIUM CHLORIDE 125 MILLILITER(S): 9 INJECTION, SOLUTION INTRAVENOUS at 09:46

## 2023-09-15 RX ADMIN — OXYCODONE HYDROCHLORIDE 10 MILLIGRAM(S): 5 TABLET ORAL at 04:18

## 2023-09-15 RX ADMIN — CELECOXIB 200 MILLIGRAM(S): 200 CAPSULE ORAL at 10:13

## 2023-09-15 RX ADMIN — PANTOPRAZOLE SODIUM 40 MILLIGRAM(S): 20 TABLET, DELAYED RELEASE ORAL at 05:46

## 2023-09-15 RX ADMIN — OXYCODONE HYDROCHLORIDE 10 MILLIGRAM(S): 5 TABLET ORAL at 14:00

## 2023-09-15 RX ADMIN — HYDROMORPHONE HYDROCHLORIDE 0.5 MILLIGRAM(S): 2 INJECTION INTRAMUSCULAR; INTRAVENOUS; SUBCUTANEOUS at 06:10

## 2023-09-15 RX ADMIN — CELECOXIB 200 MILLIGRAM(S): 200 CAPSULE ORAL at 09:46

## 2023-09-15 RX ADMIN — SENNA PLUS 2 TABLET(S): 8.6 TABLET ORAL at 21:02

## 2023-09-15 NOTE — PHARMACOTHERAPY INTERVENTION NOTE - COMMENTS
Admission medication reconciliation POD1
Admission medication reconciliation POD1
Transition of Care video discharge education - medication calendar given to patient

## 2023-09-15 NOTE — DISCHARGE NOTE NURSING/CASE MANAGEMENT/SOCIAL WORK - NSDCFUADDAPPT_GEN_ALL_CORE_FT
It is advisable to follow up w/ your pcp in 2-3 weeks to be sure there are no underlying problems.  Physical Therapist to visit the day after hospital discharge; Registered Nurse to follow if there is a need. Please contact the home care agency at the above phone number if you have not heard from them by 12 noon on the day after your hospital discharge

## 2023-09-15 NOTE — CARE COORDINATION ASSESSMENT. - NSDCPLANSERVICES_GEN_ALL_CORE
CM met with patient at bedside.  Patient. A&O x 4. Pt s/p  PROCEDURES: Total Right  knee replacement.   Pt  resting comfortably / Pt has no complaints at this time.  CM explained role of CM and availability to assist with discharge planning throughout stay.   Provided CM contact information and pt. verbalized understanding. Patient lives with his wife in a private house, 5 steps with HR to enter. Prior to admission patient was ind in adls. Patient identified his wife  as his caregiver at home who will assist him during his recuperation and will transport him home and to MD appointments.   Pt. is agreeable with PT/OT's recommendations for d/c plan to home with HC/PT.  CM explained home care expectations, process, insurance provisions and home safety with good understanding.   Offered list of [facility] and pt. chose [facility] at home care agency.  Provided discharge resources folder. CM made referral accordingly.  Informed them of Anticipated  DC 9/16/2023 and for SOC  9/17/2023.  Patient provided with info on the transitional care management/health solutions team who will be following him upon DC.   Pt verbalizing understanding and in agreement with d/c plans.   DME: Pt has a rolling walker and cane at home   PCP: Dr Mckeon  Pt stated he will be receiving meds to bed from [facility] pharmacy prior to DC./Home Care

## 2023-09-15 NOTE — CARE COORDINATION ASSESSMENT. - NSCAREPROVIDERS_GEN_ALL_CORE_FT
CARE PROVIDERS:  Administration: Kym Fraga  Administration: Emily Chang  Admitting: Bertin Gimenez  Attending: Bertin Gimenez  Case Management: Santaromana, Anna  Consultant: Cedrick Hazel  Infection Control: Fanny Pablo  Nurse: Candace Flowers  Nurse: Radha Xie  Nurse: Tete Yu  Nurse: Sweetie Davila  Nurse: Betty Knight  Nurse: Rosalind Rachel  Ordered: Greg Pearson  Ordered: Physician, Ordering  Override: Ej Castellanos  Override: Rosalind Rachel  PCA/Nursing Assistant: Philip Hennessy  Physical Therapy: Pili Camargo  Primary Team: HELENA Ocasio  Primary Team: Jose Franco  Primary Team: Radha Nichols  Primary Team: Mckenna King  Primary Team: Kay Mensah  Respiratory Therapy: Rudolph Avilez  Respiratory Therapy: Marcin Leung  : Helena Hunter  Team: ELIAS  Hospitalists, Team  UR// Supp. Assoc.: Angelina Villarreal

## 2023-09-15 NOTE — PROGRESS NOTE ADULT - SUBJECTIVE AND OBJECTIVE BOX
Procedure: Right TKR  POD #: 1  S: Pt without complaints. No SOB,CP, N/V. Tolerated Diet well.   Pain comfortable on tylenol and celebrex.  oxy 10 used as needed.  Received dilaudid IV as well.  No BM yet, + flatus, No abdominal pain.  Pain Rx:  acetaminophen     Tablet .. 1000 milliGRAM(s) Oral every 8 hours  celecoxib 200 milliGRAM(s) Oral every 12 hours  HYDROmorphone  Injectable 0.5 milliGRAM(s) IV Push every 3 hours PRN  ondansetron Injectable 4 milliGRAM(s) IV Push every 6 hours PRN  oxyCODONE    IR 5 milliGRAM(s) Oral every 3 hours PRN  oxyCODONE    IR 10 milliGRAM(s) Oral every 3 hours PRN    O: General: On exam, No Apparent Distress  Vital Signs Last 24 Hrs  T(C): 36.6 (15 Sep 2023 07:46), Max: 36.9 (14 Sep 2023 19:00)  T(F): 97.9 (15 Sep 2023 07:46), Max: 98.4 (14 Sep 2023 19:00)  HR: 70 (15 Sep 2023 07:46) (70 - 93)  BP: 146/77 (15 Sep 2023 07:46) (130/58 - 158/84)  BP(mean): --  RR: 18 (15 Sep 2023 07:46) (12 - 18)  SpO2: 94% (15 Sep 2023 07:46) (93% - 99%)    Parameters below as of 15 Sep 2023 07:46  Patient On (Oxygen Delivery Method): room air        Lungs: BS clear bilat.  Heart: RRR no murmur  Abdomen: + BS, soft , benign exam     Ext -- right knee ace removed.  hemovace d/c'ed as well.  Spandage applied for use under cryocuff  ROM: 0-50  Neurologic:  Has sensation over feet & toes bilat. Full AROM bilat feet & toes. EHL/AT = 5/5  Vascular: Feet toes warm, pink. DP = 2+. No calf tenderness bilat..  VTEP: On Bilat. Venodynes + aspirin enteric coated 81 milliGRAM(s) Oral every 12 hours    I&O's Detail    14 Sep 2023 07:01  -  15 Sep 2023 07:00  --------------------------------------------------------  IN:    Lactated Ringers: 1000 mL    Sodium Chloride 0.9% Bolus: 500 mL  Total IN: 1500 mL    OUT:    Accordian (mL): 500 mL    Blood Loss (mL): 150 mL    Voided (mL): 1400 mL  Total OUT: 2050 mL    Total NET: -550 mL          Activity in PT yesterday : Walked 75'  Hospitalist input noted.  Labs Today:                         10.8   16.18 )-----------( 274      ( 15 Sep 2023 06:30 )             33.4       09-15    136  |  103  |  18  ----------------------------<  178<H>  4.3   |  22  |  0.97    Ca    9.5      15 Sep 2023 06:30        Primary Orthopedic Assessment:  • Stable from Orthopedic perspective  • Neuro motor exam stable  • Labs: CBC--postop anemia well tolerated / Chem stable      Plan:   • Continue:  PT/OT/WBAT with assistance of a walker/Ice to knee/ Knee ROM         Incentive spirometry encouraged   • Continue DVT prophylaxis as prescribed, including use of compression devices and ankle pumps  • Continue Pain Rx  • Plans per Medicine  • Discharge planning – anticipated discharge is Home D/C with home care & home PT  when medically stable & cleared by PT/OT--today  Cryocuff teaching done  Opioid safety discussed w/ pt.  Do not keep opioid in the medicine cabinet in bathroom where others may have access to it.  Do not drive while taking opioid.  To dispose of it some pharmacies do have drop boxes as do police stations.  Do not flush or put in trash.     Procedure: Right TKR  POD #: 1  S: Pt with complaint of right lateral knee pain. No SOB,CP, N/V. Tolerated Diet well.   Pain comfortable on tylenol and celebrex.  oxy 10 used as needed.  Received dilaudid IV as well.  No BM yet, + flatus, No abdominal pain.  Pain Rx:  acetaminophen     Tablet .. 1000 milliGRAM(s) Oral every 8 hours  celecoxib 200 milliGRAM(s) Oral every 12 hours  HYDROmorphone  Injectable 0.5 milliGRAM(s) IV Push every 3 hours PRN  ondansetron Injectable 4 milliGRAM(s) IV Push every 6 hours PRN  oxyCODONE    IR 5 milliGRAM(s) Oral every 3 hours PRN  oxyCODONE    IR 10 milliGRAM(s) Oral every 3 hours PRN    O: General: On exam, No Apparent Distress  Vital Signs Last 24 Hrs  T(C): 36.6 (15 Sep 2023 07:46), Max: 36.9 (14 Sep 2023 19:00)  T(F): 97.9 (15 Sep 2023 07:46), Max: 98.4 (14 Sep 2023 19:00)  HR: 70 (15 Sep 2023 07:46) (70 - 93)  BP: 146/77 (15 Sep 2023 07:46) (130/58 - 158/84)  BP(mean): --  RR: 18 (15 Sep 2023 07:46) (12 - 18)  SpO2: 94% (15 Sep 2023 07:46) (93% - 99%)    Parameters below as of 15 Sep 2023 07:46  Patient On (Oxygen Delivery Method): room air        Lungs: BS clear bilat.  Heart: RRR no murmur  Abdomen: + BS, soft , benign exam     Ext -- right knee ace removed.  hemovace d/c'ed as well.  Spandage applied for use under cryocuff  ROM: 0-50  Neurologic:  Has sensation over feet & toes bilat. Full AROM bilat feet & toes. EHL/AT = 5/5  Vascular: Feet toes warm, pink. DP = 2+. No calf tenderness bilat..  VTEP: On Bilat. Venodynes + aspirin enteric coated 81 milliGRAM(s) Oral every 12 hours    I&O's Detail    14 Sep 2023 07:01  -  15 Sep 2023 07:00  --------------------------------------------------------  IN:    Lactated Ringers: 1000 mL    Sodium Chloride 0.9% Bolus: 500 mL  Total IN: 1500 mL    OUT:    Accordian (mL): 500 mL    Blood Loss (mL): 150 mL    Voided (mL): 1400 mL  Total OUT: 2050 mL    Total NET: -550 mL          Activity in PT yesterday : Walked 75'  Hospitalist input noted.  Labs Today:                         10.8   16.18 )-----------( 274      ( 15 Sep 2023 06:30 )             33.4       09-15    136  |  103  |  18  ----------------------------<  178<H>  4.3   |  22  |  0.97    Ca    9.5      15 Sep 2023 06:30        Primary Orthopedic Assessment:  • Stable from Orthopedic perspective  • Neuro motor exam stable  • Labs: CBC--postop anemia well tolerated / Chem stable      Plan:   • Continue:  PT/OT/WBAT with assistance of a walker/Ice to knee/ Knee ROM         Incentive spirometry encouraged   • Continue DVT prophylaxis as prescribed, including use of compression devices and ankle pumps  • Continue Pain Rx  • Plans per Medicine  • Discharge planning – anticipated discharge is Home D/C with home care & home PT  when medically stable & cleared by PT/OT--today  Cryocuff teaching done  Opioid safety discussed w/ pt.  Do not keep opioid in the medicine cabinet in bathroom where others may have access to it.  Do not drive while taking opioid.  To dispose of it some pharmacies do have drop boxes as do police stations.  Do not flush or put in trash.

## 2023-09-15 NOTE — DISCHARGE NOTE NURSING/CASE MANAGEMENT/SOCIAL WORK - NSSCNAMETXT_GEN_ALL_CORE
[facility] care agency [phone] will reach out to you within 24-72 hours of your discharge to schedule home care visit/eval appointment with you. Please call agency for any queries regarding home care services

## 2023-09-15 NOTE — CASE MANAGEMENT PROGRESS NOTE - NSCMPROGRESSNOTE_GEN_ALL_CORE
The patient is confined to one level of the home environment and there is no toilet on that level.

## 2023-09-15 NOTE — PROGRESS NOTE ADULT - SUBJECTIVE AND OBJECTIVE BOX
Patient is a 73y old  Male who presents with a chief complaint of right knee pain--for right TKA w/ maria r guidance (15 Sep 2023 08:54)      INTERVAL HPI/OVERNIGHT EVENTS:  Patient seen and examined in am, feels well, able to ambulate with walker, did have some buckling of knee but no unsteadiness.  pain is  fair controlled. Denies dizziness fever, chills, nausea, vomiting.     ROS reviewed and is otherwise negative        Vital Signs Last 24 Hrs  T(C): 36.6 (15 Sep 2023 07:46), Max: 36.9 (14 Sep 2023 19:00)  T(F): 97.9 (15 Sep 2023 07:46), Max: 98.4 (14 Sep 2023 19:00)  HR: 70 (15 Sep 2023 07:46) (70 - 93)  BP: 146/77 (15 Sep 2023 07:46) (130/58 - 158/84)  BP(mean): --  RR: 18 (15 Sep 2023 07:46) (12 - 18)  SpO2: 94% (15 Sep 2023 07:46) (93% - 99%)    Parameters below as of 15 Sep 2023 07:46  Patient On (Oxygen Delivery Method): room air        PHYSICAL EXAM:  GENERAL: NAD, well-groomed, well-developed  HEAD:  Atraumatic, Normocephalic  EYES: EOMI, PERRLA  ENMT: Moist mucous membranes, No lesions;  NECK: Supple, No JVD, Normal thyroid  NERVOUS SYSTEM:  Alert & Oriented X3, Good concentration; no focal deficits  CHEST/LUNG: Clear to auscultation bilaterally; No rales, rhonchi, wheezing, or rubs  HEART: Regular rate and rhythm; No murmurs, rubs, or gallops  ABDOMEN: Soft, Nontender, Nondistended; Bowel sounds present  EXTREMITIES:  + Pulses,right knee site c/d/i  SKIN: No rashes or lesions    MEDICATIONS  (STANDING):  acetaminophen     Tablet .. 1000 milliGRAM(s) Oral every 8 hours  aspirin enteric coated 81 milliGRAM(s) Oral every 12 hours  atorvastatin 20 milliGRAM(s) Oral daily  celecoxib 200 milliGRAM(s) Oral every 12 hours  influenza  Vaccine (HIGH DOSE) 0.7 milliLiter(s) IntraMuscular once  lactated ringers. 1000 milliLiter(s) (125 mL/Hr) IV Continuous <Continuous>  pantoprazole    Tablet 40 milliGRAM(s) Oral before breakfast  polyethylene glycol 3350 17 Gram(s) Oral at bedtime  senna 2 Tablet(s) Oral at bedtime    MEDICATIONS  (PRN):  aluminum hydroxide/magnesium hydroxide/simethicone Suspension 30 milliLiter(s) Oral four times a day PRN Indigestion  HYDROmorphone  Injectable 0.5 milliGRAM(s) IV Push every 3 hours PRN Breakthrough pain  magnesium hydroxide Suspension 30 milliLiter(s) Oral daily PRN Constipation  ondansetron Injectable 4 milliGRAM(s) IV Push every 6 hours PRN Nausea and/or Vomiting  oxyCODONE    IR 5 milliGRAM(s) Oral every 3 hours PRN Moderate Pain (4 - 6)  oxyCODONE    IR 10 milliGRAM(s) Oral every 3 hours PRN Severe Pain (7 - 10)      Allergies    shellfish (Anaphylaxis)  No Known Drug Allergies  fish (Anaphylaxis)    Intolerances          LABS:                        10.8   16.18 )-----------( 274      ( 15 Sep 2023 06:30 )             33.4     15 Sep 2023 06:30    136    |  103    |  18     ----------------------------<  178    4.3     |  22     |  0.97     Ca    9.5        15 Sep 2023 06:30        Urinalysis Basic - ( 15 Sep 2023 06:30 )    Color: x / Appearance: x / SG: x / pH: x  Gluc: 178 mg/dL / Ketone: x  / Bili: x / Urobili: x   Blood: x / Protein: x / Nitrite: x   Leuk Esterase: x / RBC: x / WBC x   Sq Epi: x / Non Sq Epi: x / Bacteria: x      CAPILLARY BLOOD GLUCOSE          RADIOLOGY & ADDITIONAL TESTS:        Care Discussed with Consultants/Other Providers:    Advanced Directives: [ ] DNR  [ ] No feeding tube  [ ] MOLST in chart  [ ] MOLST completed today  [ ] Unknown

## 2023-09-15 NOTE — PROGRESS NOTE ADULT - ASSESSMENT
72 yo male, pmh of hld, OA, presenting for right knee replacement  - pain control, dvt ppx as per ortho  - bowel regimen  - continue home statin  - no medical contraindication to DC pending PT clearance.

## 2023-09-15 NOTE — DISCHARGE NOTE NURSING/CASE MANAGEMENT/SOCIAL WORK - PATIENT PORTAL LINK FT
You can access the FollowMyHealth Patient Portal offered by [facility] by registering at the following website: [email or web address]. By joining [employer]’s FollowMyHealth portal, you will also be able to view your health information using other applications (apps) compatible with our system.

## 2023-09-15 NOTE — CARE COORDINATION ASSESSMENT. - NSPASTMEDSURGHISTORY_GEN_ALL_CORE_FT
PAST MEDICAL & SURGICAL HISTORY:  History of nocturia      Dyspnea on exertion      History of COVID-19      Dyslipidemia      Primary osteoarthritis of right knee      COVID-19 vaccine series completed      Osteoarthritis      History of arthroscopy of left knee      History of arthroscopy of right knee      History of repair of left rotator cuff      History of lumbar spinal fusion

## 2023-09-15 NOTE — PROGRESS NOTE ADULT - SUBJECTIVE AND OBJECTIVE BOX
Discharge medication calendar:  ASA EC 81mg q12h x 4 weeks  Omeprazole 20mg QAM x 4 weeks  APAP 1000mg q8h x 2-3 weeks  Narcotic PRN  Docusate 100mg TID while taking narcotic  Miralax, Senna, or Bisacodyl PRN for treatment of constipation  Celecoxib 200mg q12h x 2-3 weeks

## 2023-09-16 VITALS
RESPIRATION RATE: 18 BRPM | HEART RATE: 86 BPM | TEMPERATURE: 98 F | SYSTOLIC BLOOD PRESSURE: 128 MMHG | OXYGEN SATURATION: 95 % | DIASTOLIC BLOOD PRESSURE: 73 MMHG

## 2023-09-16 LAB
ANION GAP SERPL CALC-SCNC: 8 MMOL/L — SIGNIFICANT CHANGE UP (ref 5–17)
BUN SERPL-MCNC: 19 MG/DL — SIGNIFICANT CHANGE UP (ref 7–23)
CALCIUM SERPL-MCNC: 9.7 MG/DL — SIGNIFICANT CHANGE UP (ref 8.4–10.5)
CHLORIDE SERPL-SCNC: 103 MMOL/L — SIGNIFICANT CHANGE UP (ref 96–108)
CO2 SERPL-SCNC: 29 MMOL/L — SIGNIFICANT CHANGE UP (ref 22–31)
CREAT SERPL-MCNC: 0.97 MG/DL — SIGNIFICANT CHANGE UP (ref 0.5–1.3)
EGFR: 83 ML/MIN/1.73M2 — SIGNIFICANT CHANGE UP
GLUCOSE SERPL-MCNC: 122 MG/DL — HIGH (ref 70–99)
HCT VFR BLD CALC: 35.5 % — LOW (ref 39–50)
HGB BLD-MCNC: 11.2 G/DL — LOW (ref 13–17)
MCHC RBC-ENTMCNC: 31.5 GM/DL — LOW (ref 32–36)
MCHC RBC-ENTMCNC: 31.8 PG — SIGNIFICANT CHANGE UP (ref 27–34)
MCV RBC AUTO: 100.9 FL — HIGH (ref 80–100)
NRBC # BLD: 0 /100 WBCS — SIGNIFICANT CHANGE UP (ref 0–0)
PLATELET # BLD AUTO: 313 K/UL — SIGNIFICANT CHANGE UP (ref 150–400)
POTASSIUM SERPL-MCNC: 4.3 MMOL/L — SIGNIFICANT CHANGE UP (ref 3.5–5.3)
POTASSIUM SERPL-SCNC: 4.3 MMOL/L — SIGNIFICANT CHANGE UP (ref 3.5–5.3)
RBC # BLD: 3.52 M/UL — LOW (ref 4.2–5.8)
RBC # FLD: 12.4 % — SIGNIFICANT CHANGE UP (ref 10.3–14.5)
SODIUM SERPL-SCNC: 140 MMOL/L — SIGNIFICANT CHANGE UP (ref 135–145)
WBC # BLD: 17.76 K/UL — HIGH (ref 3.8–10.5)
WBC # FLD AUTO: 17.76 K/UL — HIGH (ref 3.8–10.5)

## 2023-09-16 PROCEDURE — 73560 X-RAY EXAM OF KNEE 1 OR 2: CPT

## 2023-09-16 PROCEDURE — C1776: CPT

## 2023-09-16 PROCEDURE — 97530 THERAPEUTIC ACTIVITIES: CPT

## 2023-09-16 PROCEDURE — 94664 DEMO&/EVAL PT USE INHALER: CPT

## 2023-09-16 PROCEDURE — 97161 PT EVAL LOW COMPLEX 20 MIN: CPT

## 2023-09-16 PROCEDURE — 97116 GAIT TRAINING THERAPY: CPT

## 2023-09-16 PROCEDURE — 80048 BASIC METABOLIC PNL TOTAL CA: CPT

## 2023-09-16 PROCEDURE — 85027 COMPLETE CBC AUTOMATED: CPT

## 2023-09-16 PROCEDURE — C1713: CPT

## 2023-09-16 PROCEDURE — 36415 COLL VENOUS BLD VENIPUNCTURE: CPT

## 2023-09-16 PROCEDURE — S2900: CPT

## 2023-09-16 PROCEDURE — 97110 THERAPEUTIC EXERCISES: CPT

## 2023-09-16 PROCEDURE — 99232 SBSQ HOSP IP/OBS MODERATE 35: CPT

## 2023-09-16 RX ADMIN — OXYCODONE HYDROCHLORIDE 10 MILLIGRAM(S): 5 TABLET ORAL at 10:00

## 2023-09-16 RX ADMIN — OXYCODONE HYDROCHLORIDE 10 MILLIGRAM(S): 5 TABLET ORAL at 06:15

## 2023-09-16 RX ADMIN — Medication 1000 MILLIGRAM(S): at 06:45

## 2023-09-16 RX ADMIN — Medication 1000 MILLIGRAM(S): at 13:17

## 2023-09-16 RX ADMIN — CELECOXIB 200 MILLIGRAM(S): 200 CAPSULE ORAL at 10:00

## 2023-09-16 RX ADMIN — OXYCODONE HYDROCHLORIDE 10 MILLIGRAM(S): 5 TABLET ORAL at 13:11

## 2023-09-16 RX ADMIN — Medication 1000 MILLIGRAM(S): at 06:47

## 2023-09-16 RX ADMIN — OXYCODONE HYDROCHLORIDE 10 MILLIGRAM(S): 5 TABLET ORAL at 03:00

## 2023-09-16 RX ADMIN — Medication 81 MILLIGRAM(S): at 05:51

## 2023-09-16 RX ADMIN — OXYCODONE HYDROCHLORIDE 10 MILLIGRAM(S): 5 TABLET ORAL at 05:58

## 2023-09-16 RX ADMIN — OXYCODONE HYDROCHLORIDE 10 MILLIGRAM(S): 5 TABLET ORAL at 10:30

## 2023-09-16 RX ADMIN — OXYCODONE HYDROCHLORIDE 10 MILLIGRAM(S): 5 TABLET ORAL at 13:41

## 2023-09-16 RX ADMIN — OXYCODONE HYDROCHLORIDE 10 MILLIGRAM(S): 5 TABLET ORAL at 02:00

## 2023-09-16 RX ADMIN — PANTOPRAZOLE SODIUM 40 MILLIGRAM(S): 20 TABLET, DELAYED RELEASE ORAL at 05:51

## 2023-09-16 RX ADMIN — ATORVASTATIN CALCIUM 20 MILLIGRAM(S): 80 TABLET, FILM COATED ORAL at 12:35

## 2023-09-16 RX ADMIN — Medication 1000 MILLIGRAM(S): at 13:51

## 2023-09-16 NOTE — PROGRESS NOTE ADULT - SUBJECTIVE AND OBJECTIVE BOX
POD  #:  2  S/P  Right TKR                       SUBJECTIVE: Patient seen and examined. Ambulated with PT. Tolerating diet. Voiding.   Reported Pain Score = 2    OBJECTIVE:     Vital Signs Last 24 Hrs  T(C): 36.7 (16 Sep 2023 07:34), Max: 37.2 (15 Sep 2023 23:22)  T(F): 98.1 (16 Sep 2023 07:34), Max: 99 (15 Sep 2023 23:22)  HR: 71 (16 Sep 2023 07:34) (70 - 80)  BP: 148/66 (16 Sep 2023 07:34) (128/72 - 150/70)  RR: 18 (16 Sep 2023 07:34) (17 - 18)  SpO2: 95% (16 Sep 2023 07:34) (94% - 96%)    Parameters below as of 16 Sep 2023 07:34  Patient On (Oxygen Delivery Method): room air        Right Knee:         Mepilex Dressing: clean/dry/intact    Bilateral LEs:         Sensation:  intact to light touch          Motor exam:  5/5 dorsiflexion/plantarflexion/EHL          2+ DP pulses          calf supple, NT      LABS:                        11.2   17.76 )-----------( 313      ( 16 Sep 2023 06:00 )             35.5     09-16    140  |  103  |  19  ----------------------------<  122<H>  4.3   |  29  |  0.97    Ca    9.7      16 Sep 2023 06:00          MEDICATIONS:  Anticoagulation:  aspirin enteric coated 81 milliGRAM(s) Oral every 12 hours      Pain medications:   acetaminophen     Tablet .. 1000 milliGRAM(s) Oral every 8 hours  celecoxib 200 milliGRAM(s) Oral every 12 hours  oxyCODONE    IR 5 milliGRAM(s) Oral every 3 hours PRN  oxyCODONE    IR 10 milliGRAM(s) Oral every 3 hours PRN        A/P : Patient stable  s/p Right TKR POD # 2  -    Pain control  -    DVT ppx: Aspirin 81mg q 12 h   -    Weight bearing status: WBAT   -    Physical Therapy  -    Occupational Therapy  -    Discharge plan: home today pending PT, OT, medical clearance

## 2023-09-16 NOTE — PROGRESS NOTE ADULT - ASSESSMENT
72 yo male, pmh of hld, OA, presenting for right knee replacement  - pain control, dvt ppx as per ortho  - bowel regimen  - continue home statin  - no medical contraindication to DC

## 2023-09-16 NOTE — PROGRESS NOTE ADULT - SUBJECTIVE AND OBJECTIVE BOX
Patient is a 73y old  Male who presents with a chief complaint of right knee pain--for right TKA w/ maria r guidance (15 Sep 2023 08:54)      INTERVAL HPI/OVERNIGHT EVENTS:  Patient seen and examined in am, feels well, able to ambulate with walker, pain is well controlled. Denies dizziness fever, chills, nausea, vomiting.     ROS reviewed and is otherwise negative        Vital Signs Last 24 Hrs  T(C): 36.7 (16 Sep 2023 07:34), Max: 37.2 (15 Sep 2023 23:22)  T(F): 98.1 (16 Sep 2023 07:34), Max: 99 (15 Sep 2023 23:22)  HR: 71 (16 Sep 2023 07:34) (70 - 80)  BP: 148/66 (16 Sep 2023 07:34) (128/72 - 150/70)  BP(mean): --  RR: 18 (16 Sep 2023 07:34) (17 - 18)  SpO2: 95% (16 Sep 2023 07:34) (94% - 96%)    Parameters below as of 16 Sep 2023 07:34  Patient On (Oxygen Delivery Method): room air        PHYSICAL EXAM:  GENERAL: NAD, well-groomed, well-developed  HEAD:  Atraumatic, Normocephalic  EYES: EOMI, PERRLA  ENMT: Moist mucous membranes,  No lesions;  NECK: Supple, No JVD,   NERVOUS SYSTEM:  Alert & Oriented X3, Good concentration; All 4 extremities mobile, no gross sensory deficits.   CHEST/LUNG: Clear to auscultation bilaterally; No rales, rhonchi, wheezing, or rubs  HEART: Regular rate and rhythm; No murmurs, rubs, or gallops  ABDOMEN: Soft, Nontender, Nondistended; Bowel sounds present  EXTREMITIES:  + Pulses, right knee non tender  SKIN: No rashes or lesions    MEDICATIONS  (STANDING):  acetaminophen     Tablet .. 1000 milliGRAM(s) Oral every 8 hours  aspirin enteric coated 81 milliGRAM(s) Oral every 12 hours  atorvastatin 20 milliGRAM(s) Oral daily  celecoxib 200 milliGRAM(s) Oral every 12 hours  influenza  Vaccine (HIGH DOSE) 0.7 milliLiter(s) IntraMuscular once  lactated ringers. 1000 milliLiter(s) (125 mL/Hr) IV Continuous <Continuous>  pantoprazole    Tablet 40 milliGRAM(s) Oral before breakfast  polyethylene glycol 3350 17 Gram(s) Oral at bedtime  senna 2 Tablet(s) Oral at bedtime    MEDICATIONS  (PRN):  aluminum hydroxide/magnesium hydroxide/simethicone Suspension 30 milliLiter(s) Oral four times a day PRN Indigestion  bisacodyl Suppository 10 milliGRAM(s) Rectal once PRN Constipation  HYDROmorphone  Injectable 0.5 milliGRAM(s) IV Push every 3 hours PRN Breakthrough pain  magnesium hydroxide Suspension 30 milliLiter(s) Oral daily PRN Constipation  ondansetron Injectable 4 milliGRAM(s) IV Push every 6 hours PRN Nausea and/or Vomiting  oxyCODONE    IR 5 milliGRAM(s) Oral every 3 hours PRN Moderate Pain (4 - 6)  oxyCODONE    IR 10 milliGRAM(s) Oral every 3 hours PRN Severe Pain (7 - 10)      Allergies    shellfish (Anaphylaxis)  No Known Drug Allergies  fish (Anaphylaxis)    Intolerances          LABS:                        11.2   17.76 )-----------( 313      ( 16 Sep 2023 06:00 )             35.5     16 Sep 2023 06:00    140    |  103    |  19     ----------------------------<  122    4.3     |  29     |  0.97     Ca    9.7        16 Sep 2023 06:00        Urinalysis Basic - ( 16 Sep 2023 06:00 )    Color: x / Appearance: x / SG: x / pH: x  Gluc: 122 mg/dL / Ketone: x  / Bili: x / Urobili: x   Blood: x / Protein: x / Nitrite: x   Leuk Esterase: x / RBC: x / WBC x   Sq Epi: x / Non Sq Epi: x / Bacteria: x      CAPILLARY BLOOD GLUCOSE          RADIOLOGY & ADDITIONAL TESTS:        Care Discussed with Consultants/Other Providers:    Advanced Directives: [ ] DNR  [ ] No feeding tube  [ ] MOLST in chart  [ ] MOLST completed today  [ ] Unknown

## 2023-09-17 ENCOUNTER — TRANSCRIPTION ENCOUNTER (OUTPATIENT)
Age: 73
End: 2023-09-17

## 2023-09-18 ENCOUNTER — EMERGENCY (EMERGENCY)
Facility: HOSPITAL | Age: 73
LOS: 1 days | Discharge: HOME | End: 2023-09-18
Attending: EMERGENCY MEDICINE | Admitting: EMERGENCY MEDICINE
Payer: MEDICARE

## 2023-09-18 ENCOUNTER — TRANSCRIPTION ENCOUNTER (OUTPATIENT)
Age: 73
End: 2023-09-18

## 2023-09-18 VITALS
TEMPERATURE: 99 F | SYSTOLIC BLOOD PRESSURE: 136 MMHG | DIASTOLIC BLOOD PRESSURE: 69 MMHG | HEART RATE: 92 BPM | RESPIRATION RATE: 16 BRPM | WEIGHT: 223.99 LBS | OXYGEN SATURATION: 96 % | HEIGHT: 69 IN

## 2023-09-18 VITALS
OXYGEN SATURATION: 97 % | HEART RATE: 92 BPM | DIASTOLIC BLOOD PRESSURE: 72 MMHG | TEMPERATURE: 98 F | RESPIRATION RATE: 16 BRPM | SYSTOLIC BLOOD PRESSURE: 152 MMHG

## 2023-09-18 DIAGNOSIS — Z98.890 OTHER SPECIFIED POSTPROCEDURAL STATES: Chronic | ICD-10-CM

## 2023-09-18 DIAGNOSIS — Z98.1 ARTHRODESIS STATUS: Chronic | ICD-10-CM

## 2023-09-18 PROCEDURE — 93971 EXTREMITY STUDY: CPT

## 2023-09-18 PROCEDURE — 73562 X-RAY EXAM OF KNEE 3: CPT | Mod: 26,RT

## 2023-09-18 PROCEDURE — 73562 X-RAY EXAM OF KNEE 3: CPT

## 2023-09-18 PROCEDURE — 99284 EMERGENCY DEPT VISIT MOD MDM: CPT | Mod: 25

## 2023-09-18 PROCEDURE — 93971 EXTREMITY STUDY: CPT | Mod: 26,RT

## 2023-09-18 PROCEDURE — 99284 EMERGENCY DEPT VISIT MOD MDM: CPT | Mod: FS

## 2023-09-18 RX ORDER — OXYCODONE HYDROCHLORIDE 5 MG/1
5 TABLET ORAL ONCE
Refills: 0 | Status: DISCONTINUED | OUTPATIENT
Start: 2023-09-18 | End: 2023-09-18

## 2023-09-18 RX ADMIN — OXYCODONE HYDROCHLORIDE 5 MILLIGRAM(S): 5 TABLET ORAL at 16:12

## 2023-09-18 RX ADMIN — OXYCODONE HYDROCHLORIDE 5 MILLIGRAM(S): 5 TABLET ORAL at 15:42

## 2023-09-18 NOTE — ED PROVIDER NOTE - CARE PROVIDER_API CALL
Bertin Gimenez  Orthopaedic Surgery  [address], [address]  [place] [address]  Phone: [phone]  Fax: [phone]  Follow Up Time: Urgent

## 2023-09-18 NOTE — ED ADULT NURSE NOTE - OBJECTIVE STATEMENT
Patient is a 73-year-old male with past medical history of hld presents with right leg swelling.  Patient reports on September 14 had a right knee replacement with Dr. alvarenga.  Patient reports his physical therapist and does noted today that his right leg was more swollen than usual and he had increased pain with "tightness".  Patient reports he took oxycodone for symptoms.  Provided no relief.  Patient denies fever, chills, chest pain, shortness of breath.

## 2023-09-18 NOTE — ED PROVIDER NOTE - PHYSICAL EXAMINATION
ms right ue: diffuse swelling with mild calf and posterior knee tenderness. nvi + bandage intact right anterior knee pt does not want removed. + ecchymosis thigh and foot.

## 2023-09-18 NOTE — ED PROVIDER NOTE - NSFOLLOWUPINSTRUCTIONS_ED_ALL_ED_FT
Edema  A person's legs and feet. One leg is normal and the other leg is affected by edema.  Edema is an abnormal buildup of fluids in the body tissues and under the skin. Swelling of the legs, feet, and ankles is a common symptom that becomes more likely as you get older. Swelling is also common in looser tissues, such as around the eyes. Pressing on the area may make a temporary dent in your skin (pitting edema). This fluid may also accumulate in your lungs (pulmonary edema).    There are many possible causes of edema. Eating too much salt (sodium) and being on your feet or sitting for a long time can cause edema in your legs, feet, and ankles. Common causes of edema include:  Certain medical conditions, such as heart failure, liver or kidney disease, and cancer.  Weak leg blood vessels.  An injury.  Pregnancy.  Medicines.  Being obese.  Low protein levels in the blood.  Hot weather may make edema worse. Edema is usually painless. Your skin may look swollen or shiny.    Follow these instructions at home:  Medicines    Take over-the-counter and prescription medicines only as told by your health care provider.  Your health care provider may prescribe a medicine to help your body get rid of extra water (diuretic). Take this medicine if you are told to take it.  Eating and drinking    Eat a low-salt (low-sodium) diet to reduce fluid as told by your health care provider. Sometimes, eating less salt may reduce swelling.  Depending on the cause of your swelling, you may need to limit how much fluid you drink (fluid restriction).  General instructions    Raise (elevate) the injured area above the level of your heart while you are sitting or lying down.  Do not sit still or stand for long periods of time.  Do not wear tight clothing. Do not wear garters on your upper legs.  Exercise your legs to get your circulation going. This helps to move the fluid back into your blood vessels, and it may help the swelling go down.  Wear compression stockings as told by your health care provider. These stockings help to prevent blood clots and reduce swelling in your legs. It is important that these are the correct size. These stockings should be prescribed by your health care provider to prevent possible injuries.  If elastic bandages or wraps are recommended, use them as told by your health care provider.  Contact a health care provider if:  Your edema does not get better with treatment.  You have heart, liver, or kidney disease and have symptoms of edema.  You have sudden and unexplained weight gain.  Get help right away if:  You develop shortness of breath or chest pain.  You cannot breathe when you lie down.  You develop pain, redness, or warmth in the swollen areas.  You have heart, liver, or kidney disease and suddenly get edema.  You have a fever and your symptoms suddenly get worse.  These symptoms may be an emergency. Get help right away. Call 911.  Do not wait to see if the symptoms will go away.  Do not drive yourself to the hospital.  Summary  Edema is an abnormal buildup of fluids in the body tissues and under the skin.  Eating too much salt (sodium)and being on your feet or sitting for a long time can cause edema in your legs, feet, and ankles.  Raise (elevate) the injured area above the level of your heart while you are sitting or lying down.  Follow your health care provider's instructions about diet and how much fluid you can drink.  This information is not intended to replace advice given to you by your health care provider. Make sure you discuss any questions you have with your health care provider.    Document Revised: 08/22/2022 Document Reviewed: 08/22/2022

## 2023-09-18 NOTE — ED PROVIDER NOTE - NS ED ATTENDING STATEMENT MOD
This was a shared visit with the ZENY. I reviewed and verified the documentation and independently performed the documented:

## 2023-09-18 NOTE — ED PROVIDER NOTE - PROGRESS NOTE DETAILS
pt improved. advised ortho follow up tomorrow. advised rice All questions answered and concerns addressed. pt verbalized understanding and agreement with plan and dx. pt advised on next step and when/where to follow up. pt advised on all take home and otc medications. pt advised to follow up with PMD. pt advised to return to ed for worsenng symptoms including fever, cp, sob. will dc.

## 2023-09-18 NOTE — ED PROVIDER NOTE - PATIENT PORTAL LINK FT
You can access the FollowMyHealth Patient Portal offered by [facility] by registering at the following website: [email or web address]. By joining [employer]’s FollowMyHealth portal, you will also be able to view your health information using other applications (apps) compatible with our system.

## 2023-09-18 NOTE — ED PROVIDER NOTE - CLINICAL SUMMARY MEDICAL DECISION MAKING FREE TEXT BOX
73-year-old male with past medical history of hld presents with right leg swelling.  Patient reports on September 14 had a right knee replacement with Dr. Gimenez.  Patient reports his physical therapist and does noted today that his right leg was more swollen than usual and he had increased pain with "tightness".  Patient reports he took oxycodone for symptoms.  Provided no relief.  Patient denies fever, chills, chest pain, shortness of breath.    VSS Afebrile, NAD  HEENT - clear  PERRL EOMI  Neck supple  lungs clear  Cor S1S2 RR - MGR  Abd soft nontender, no mass or HSM, no rebound  Ext rt leg diffuse swelling with mild calf and posterior knee tenderness. nvi + bandage intact right anterior knee pt does not want removed. + ecchymosis thigh and foot.  Neuro Intact, no deficits.  Skin Warm and dry no rash.  Imp- RO DVT vs post op swelling/infection.

## 2023-09-18 NOTE — ED ADULT NURSE NOTE - NSFALLHARMRISKINTERV_ED_ALL_ED

## 2023-09-19 ENCOUNTER — TRANSCRIPTION ENCOUNTER (OUTPATIENT)
Age: 73
End: 2023-09-19

## 2023-09-20 ENCOUNTER — APPOINTMENT (OUTPATIENT)
Dept: ORTHOPEDIC SURGERY | Facility: CLINIC | Age: 73
End: 2023-09-20
Payer: MEDICARE

## 2023-09-20 ENCOUNTER — TRANSCRIPTION ENCOUNTER (OUTPATIENT)
Age: 73
End: 2023-09-20

## 2023-09-20 PROCEDURE — 73562 X-RAY EXAM OF KNEE 3: CPT | Mod: RT

## 2023-09-20 PROCEDURE — 99024 POSTOP FOLLOW-UP VISIT: CPT

## 2023-09-20 RX ORDER — OXYCODONE 5 MG/1
5 TABLET ORAL
Qty: 20 | Refills: 0 | Status: ACTIVE | COMMUNITY
Start: 2023-09-20 | End: 1900-01-01

## 2023-09-21 ENCOUNTER — OUTPATIENT (OUTPATIENT)
Dept: OUTPATIENT SERVICES | Facility: HOSPITAL | Age: 73
LOS: 1 days | End: 2023-09-21
Payer: MEDICARE

## 2023-09-21 ENCOUNTER — APPOINTMENT (OUTPATIENT)
Dept: MRI IMAGING | Facility: CLINIC | Age: 73
End: 2023-09-21
Payer: MEDICARE

## 2023-09-21 DIAGNOSIS — Z00.00 ENCOUNTER FOR GENERAL ADULT MEDICAL EXAMINATION WITHOUT ABNORMAL FINDINGS: ICD-10-CM

## 2023-09-21 DIAGNOSIS — Z98.890 OTHER SPECIFIED POSTPROCEDURAL STATES: Chronic | ICD-10-CM

## 2023-09-21 DIAGNOSIS — Z98.1 ARTHRODESIS STATUS: Chronic | ICD-10-CM

## 2023-09-21 PROCEDURE — 73721 MRI JNT OF LWR EXTRE W/O DYE: CPT | Mod: 26,RT,MH

## 2023-09-21 PROCEDURE — 73721 MRI JNT OF LWR EXTRE W/O DYE: CPT

## 2023-09-27 ENCOUNTER — APPOINTMENT (OUTPATIENT)
Dept: ORTHOPEDIC SURGERY | Facility: CLINIC | Age: 73
End: 2023-09-27
Payer: MEDICARE

## 2023-09-27 VITALS — BODY MASS INDEX: 33.18 KG/M2 | WEIGHT: 224 LBS | HEIGHT: 69 IN

## 2023-09-27 PROCEDURE — 73562 X-RAY EXAM OF KNEE 3: CPT | Mod: RT

## 2023-09-27 PROCEDURE — 99024 POSTOP FOLLOW-UP VISIT: CPT

## 2023-10-03 RX ORDER — OXYCODONE 5 MG/1
5 TABLET ORAL EVERY 6 HOURS
Qty: 28 | Refills: 0 | Status: ACTIVE | COMMUNITY
Start: 2023-09-26 | End: 1900-01-01

## 2023-10-11 ENCOUNTER — APPOINTMENT (OUTPATIENT)
Dept: ORTHOPEDIC SURGERY | Facility: CLINIC | Age: 73
End: 2023-10-11
Payer: MEDICARE

## 2023-10-11 VITALS — BODY MASS INDEX: 33.33 KG/M2 | HEIGHT: 69 IN | WEIGHT: 225 LBS

## 2023-10-11 PROCEDURE — 99024 POSTOP FOLLOW-UP VISIT: CPT

## 2023-10-19 RX ORDER — TRAMADOL HYDROCHLORIDE 50 MG/1
50 TABLET, COATED ORAL
Qty: 14 | Refills: 0 | Status: ACTIVE | COMMUNITY
Start: 2023-10-11 | End: 1900-01-01

## 2023-10-20 ENCOUNTER — NON-APPOINTMENT (OUTPATIENT)
Age: 73
End: 2023-10-20

## 2023-10-20 ENCOUNTER — APPOINTMENT (OUTPATIENT)
Dept: OPHTHALMOLOGY | Facility: CLINIC | Age: 73
End: 2023-10-20
Payer: MEDICARE

## 2023-10-20 PROCEDURE — 92014 COMPRE OPH EXAM EST PT 1/>: CPT

## 2023-10-20 PROCEDURE — 92133 CPTRZD OPH DX IMG PST SGM ON: CPT

## 2023-11-08 ENCOUNTER — APPOINTMENT (OUTPATIENT)
Dept: ORTHOPEDIC SURGERY | Facility: CLINIC | Age: 73
End: 2023-11-08
Payer: MEDICARE

## 2023-11-08 VITALS — BODY MASS INDEX: 32.29 KG/M2 | HEIGHT: 69 IN | WEIGHT: 218 LBS

## 2023-11-08 PROCEDURE — 99024 POSTOP FOLLOW-UP VISIT: CPT

## 2023-12-13 ENCOUNTER — APPOINTMENT (OUTPATIENT)
Dept: ORTHOPEDIC SURGERY | Facility: CLINIC | Age: 73
End: 2023-12-13
Payer: MEDICARE

## 2023-12-13 VITALS — HEIGHT: 69 IN | WEIGHT: 225 LBS | BODY MASS INDEX: 33.33 KG/M2

## 2023-12-13 PROCEDURE — 99024 POSTOP FOLLOW-UP VISIT: CPT

## 2023-12-13 NOTE — ADDENDUM
[FreeTextEntry1] : I, Hiral Saha, acted solely as a scribe for Dr. Zafar Gimenez MD on this date 12/13/2023 .  All medical record entries made by the Scribe were at my, Dr. Zafar Gimenez MD , direction and personally dictated by me on 12/13/2023 . I have reviewed the chart and agree that the record accurately reflects my personal performance of the history, physical exam, assessment and plan. I have also personally directed, reviewed, and agreed with the chart.

## 2023-12-13 NOTE — HISTORY OF PRESENT ILLNESS
[de-identified] : s/p Right total knee replacement with Isidro robot on 9/14/2023 [de-identified] : 73 year old male presents for evaluation s/p s/p Right total knee replacement with Isidro robot on 9/27/2023. Significant improvement in preoperative pain. Denies fever, chills, groin pain, thigh pain. The patient does describe some residual numbness at operative site. Slight soreness near incision site when ambulating. He went to the ER for assessment of pain and swelling. Underwent a Farideh Doppler which was negative for a blood clot. Patient obtained a post-op MRI. Reports reduction of swelling. Patient has been attending PT with good improvements in ROM and strength. He was evaluated by Dr. Simons with no significant abnormalities detected. Since his last visit, he reports his symptoms have been improving. He presents ambulating independently.  He notes difficulty when going down the stairs. He notes intermittent pain lateral aspect right knee when getting up from the bed in the morning or with prolonged activity.  [de-identified] : Constitutional: Well nourished, no distress. No neurovascular symptoms. Right Knee Exam: Incision healed. Satisfactory gait. Leg lengths equal. Ligaments intact. ROM 5 - 120 degrees. Mild effusion. Tenderness over the iliotibial band. [de-identified] : EXAM: [id number] - MR KNEE RT  - ORDERED BY: ARLETTE HUERTAS PROCEDURE DATE:  09/21/2023 INTERPRETATION:  EXAMINATION: MRI of the right knee without contrast  CLINICAL INFORMATION: Right knee swelling and pain. Status post right knee replacement.  TECHNIQUE: Only motion degraded sagittal PD and axial STIR weighted images were obtained as the patient could not tolerate a complete exam.  FINDINGS: The exam is limited by motion artifact and limited sequence acquisition, as above. The patient is status post total knee arthroplasty. There is a moderate to large joint effusion. There is no displaced periprosthetic fracture. The extensor mechanism is intact. There is diffuse subcutaneous soft tissue edema. There is no well-formed collection or mass.  IMPRESSION: Limited exam, as above. Status post right total knee arthroplasty. Moderate to large joint effusion. Diffuse subcutaneous soft tissue edema without well-formed collection or mass.  --- End of Report ---    P/Lateral/skyline views of the RIGHT knee obtained today 09/27/2023 demonstrates total knee replacement components in good alignment, no evidence of loosening, well centered patella. [de-identified] : s/p Right total knee replacement with Isidro robot on 9/14/2023 [de-identified] : I assured the patient the components are functioning properly. Continue with PT as prescribed. Follow up 4 weeks.

## 2023-12-14 ENCOUNTER — TRANSCRIPTION ENCOUNTER (OUTPATIENT)
Age: 73
End: 2023-12-14

## 2024-01-17 ENCOUNTER — APPOINTMENT (OUTPATIENT)
Dept: ORTHOPEDIC SURGERY | Facility: CLINIC | Age: 74
End: 2024-01-17
Payer: MEDICARE

## 2024-01-17 VITALS — WEIGHT: 227 LBS | HEIGHT: 69 IN | BODY MASS INDEX: 33.62 KG/M2

## 2024-01-17 PROCEDURE — 99213 OFFICE O/P EST LOW 20 MIN: CPT

## 2024-01-17 NOTE — HISTORY OF PRESENT ILLNESS
[de-identified] : LOUIE HOBSON is a 73 year old male who presents for follow up evaluation of s/p Right total knee replacement with Isidro robot on 9/14/2023. He presents ambulating independently.  Significant improvement in preoperative pain. Patient obtained a post-op MRI. Reports reduction of swelling. Patient has been attending PT with good improvements in ROM and strength. He was evaluated by Dr. Simons with no significant abnormalities detected. Since his last visit, he reports his symptoms have been improving gradually. He notes difficulty when going down the stairs. Reports residual numbness along the incision site. He complains of intermittent lateral aspect knee pain provoked with walking, relieved by "leg raises" which afterwards he can continue walking.

## 2024-01-17 NOTE — DISCUSSION/SUMMARY
[de-identified] : 73 year male old with s/p Right total knee replacement with Isidro robot on 9/14/2023.  Continue with PT as prescribed. A script was provided in the office today.  At this time, I recommended continued observation at symptoms.  FU1 month The patient understood and verbally agreed to the treatment plan. All of their questions were answered. The patient should call the office if they have any questions or experience worsening symptoms.

## 2024-01-17 NOTE — ADDENDUM
[FreeTextEntry1] : I, Hiral Saha, acted solely as a scribe for Dr. Zafar Gimenez MD on this date 01/17/2024 .  All medical record entries made by the Scribe were at my, Dr. Zafar Gimenez MD , direction and personally dictated by me on 01/17/2024 . I have reviewed the chart and agree that the record accurately reflects my personal performance of the history, physical exam, assessment and plan. I have also personally directed, reviewed, and agreed with the chart.

## 2024-01-17 NOTE — REASON FOR VISIT
[Follow-Up Visit] : a follow-up visit for [FreeTextEntry2] : s/p Right total knee replacement with Isidro robot on 9/14/2023.

## 2024-01-17 NOTE — PHYSICAL EXAM
[de-identified] : Constitutional: Well nourished , well developed and in no acute distress Psychiatric: Alert and oriented to time place and person.Appropriate affect . Skin: Head, neck, arms and lower extremities:no lesions or discoloration HEENT: Normocephalic, EOM intact, Nasal septum midline, Respiratory: Unlabored respirations,no audible wheezing ,no tachypnea, no cyanosis Cardiovascular: No leg swelling no ankle edema no JVD, pulse regular Vascular: No calf or thigh tenderness, Peripheral pulses: intact Lymphatics: No groin adenopathy,no lymphedema lower or upper extremities   o Right Knee Exam: Inspection/Palpation : tenderness to palpation over the distal iliotibial band , effusion 1+, no deformity, incision well healed.  Range of Motion : 0 - 120 degrees, no crepitus Stability : no valgus or varus instability present on provocative testing, Lachmans Test (-) Sensation : sensation to pin intact Vascular Exam : no edema, no cyanosis, dorsalis pedis artery pulse 2+, posterior tibial artery pulse 2+ Skin : no erythema, no ecchymosis [de-identified] :  EXAM: [id number] - MR KNEE RT - ORDERED BY: ARLETTE HUERTAS PROCEDURE DATE: 09/21/2023 INTERPRETATION: EXAMINATION: MRI of the right knee without contrast  CLINICAL INFORMATION: Right knee swelling and pain. Status post right knee replacement.  TECHNIQUE: Only motion degraded sagittal PD and axial STIR weighted images were obtained as the patient could not tolerate a complete exam.  FINDINGS: The exam is limited by motion artifact and limited sequence acquisition, as above. The patient is status post total knee arthroplasty. There is a moderate to large joint effusion. There is no displaced periprosthetic fracture. The extensor mechanism is intact. There is diffuse subcutaneous soft tissue edema. There is no well-formed collection or mass.  IMPRESSION: Limited exam, as above. Status post right total knee arthroplasty. Moderate to large joint effusion. Diffuse subcutaneous soft tissue edema without well-formed collection or mass.  --- End of Report --- _____________________________ AP/Lateral/skyline views of the RIGHT knee obtained 09/27/2023 demonstrates total knee replacement components in good alignment, no evidence of loosening, well centered patella.

## 2024-02-28 ENCOUNTER — APPOINTMENT (OUTPATIENT)
Dept: ORTHOPEDIC SURGERY | Facility: CLINIC | Age: 74
End: 2024-02-28
Payer: MEDICARE

## 2024-02-28 VITALS — HEIGHT: 69 IN | BODY MASS INDEX: 33.62 KG/M2 | WEIGHT: 227 LBS

## 2024-02-28 PROCEDURE — 73562 X-RAY EXAM OF KNEE 3: CPT | Mod: RT

## 2024-02-28 PROCEDURE — 99213 OFFICE O/P EST LOW 20 MIN: CPT

## 2024-02-28 NOTE — HISTORY OF PRESENT ILLNESS
[de-identified] : LOUIE HOBSON is a 73 year old male who presents for follow up evaluation of s/p Right total knee replacement with Isidro robot on 9/14/2023. He presents ambulating independently.  Significant improvement in preoperative pain. Patient has been attending PT with good improvements in ROM and strength. Since his last visit, he reports his symptoms have been improving gradually. He notes difficulty when going down the stairs. Reports residual numbness along the incision site. He complains of intermittent anterolateral aspect knee pain provoked with bending the knee, such as when lying in bed at night. He is able to negotiate going down the stairs while holding on the railing. He reports weakness of the bilateral lower extremities. Reports se  Denies any back pain.

## 2024-02-28 NOTE — PHYSICAL EXAM
[de-identified] : AP/Lateral/skyline views of the RIGHT knee obtained today 02/28/2024 demonstrates total knee replacement components in good alignment, no evidence of loosening, well centered patella, loosening under femoral groove  EXAM: [id number] - MR KNEE RT - ORDERED BY: ARLETTE HUERTAS PROCEDURE DATE: 09/21/2023  INTERPRETATION: EXAMINATION: MRI of the right knee without contrast  CLINICAL INFORMATION: Right knee swelling and pain. Status post right knee replacement.  TECHNIQUE: Only motion degraded sagittal PD and axial STIR weighted images were obtained as the patient could not tolerate a complete exam.  FINDINGS: The exam is limited by motion artifact and limited sequence acquisition, as above. The patient is status post total knee arthroplasty. There is a moderate to large joint effusion. There is no displaced periprosthetic fracture. The extensor mechanism is intact. There is diffuse subcutaneous soft tissue edema. There is no well-formed collection or mass.  IMPRESSION: Limited exam, as above. Status post right total knee arthroplasty. Moderate to large joint effusion. Diffuse subcutaneous soft tissue edema without well-formed collection or mass.  --- End of Report --- _____________________________ AP/Lateral/skyline views of the RIGHT knee obtained 09/27/2023 demonstrates total knee replacement components in good alignment, no evidence of loosening, well centered patella. [de-identified] : Constitutional: Well nourished , well developed and in no acute distress Psychiatric: Alert and oriented to time place and person.Appropriate affect . Skin: Head, neck, arms and lower extremities:no lesions or discoloration HEENT: Normocephalic, EOM intact, Nasal septum midline, Respiratory: Unlabored respirations,no audible wheezing ,no tachypnea, no cyanosis Cardiovascular: No leg swelling no ankle edema no JVD, pulse regular Vascular: No calf or thigh tenderness, Peripheral pulses: intact Lymphatics: No groin adenopathy,no lymphedema lower or upper extremities  o Right Knee Exam: Inspection/Palpation : tenderness to palpation over the midline popliteal, effusion 1+, no deformity, incision well healed.  Range of Motion : 0 - 120 degrees, no crepitus Stability : Mild varus laxity instability present on provocative testing, Lachmans Test (-) Addutional tests: Flexion distraction (Minimally+) Sensation : sensation to pin intact Vascular Exam : no edema, no cyanosis, dorsalis pedis artery pulse 2+, posterior tibial artery pulse 2+ Skin : no erythema, no ecchymosis

## 2024-02-28 NOTE — DISCUSSION/SUMMARY
[de-identified] : 73 year male old with s/p Right total knee replacement with Isidro robot on 9/14/2023 with minimally positive flexion distraction and mild varus laxity.  Possible flexion instability to rule out possible CGI  A script for CBC, Sed Rate, CRP provided today. MRI of the right knee was ordered  to rule out possible CGI Follow up when results are available and discuss further treatment plan.  The patient understood and verbally agreed to the treatment plan. All of their questions were answered. The patient should call the office if they have any questions or experience worsening symptoms.

## 2024-02-28 NOTE — ADDENDUM
[FreeTextEntry1] : I, Hiral Saha, acted solely as a scribe for Dr. Zafar Gimenez MD on this date  02/28/2024  All medical record entries made by the Scribe were at my, Dr. Zafar Gimenez MD , direction and personally dictated by me on 02/28/2024. I have reviewed the chart and agree that the record accurately reflects my personal performance of the history, physical exam, assessment and plan. I have also personally directed, reviewed, and agreed with the chart.

## 2024-02-29 LAB
BASOPHILS # BLD AUTO: 0.05 K/UL
BASOPHILS NFR BLD AUTO: 0.6 %
CRP SERPL-MCNC: <3 MG/L
EOSINOPHIL # BLD AUTO: 0.13 K/UL
EOSINOPHIL NFR BLD AUTO: 1.5 %
ERYTHROCYTE [SEDIMENTATION RATE] IN BLOOD BY WESTERGREN METHOD: 22 MM/HR
HCT VFR BLD CALC: 42.4 %
HGB BLD-MCNC: 13.9 G/DL
IMM GRANULOCYTES NFR BLD AUTO: 0.2 %
LYMPHOCYTES # BLD AUTO: 2.89 K/UL
LYMPHOCYTES NFR BLD AUTO: 33.1 %
MAN DIFF?: NORMAL
MCHC RBC-ENTMCNC: 31.7 PG
MCHC RBC-ENTMCNC: 32.8 GM/DL
MCV RBC AUTO: 96.6 FL
MONOCYTES # BLD AUTO: 1.26 K/UL
MONOCYTES NFR BLD AUTO: 14.4 %
NEUTROPHILS # BLD AUTO: 4.37 K/UL
NEUTROPHILS NFR BLD AUTO: 50.2 %
PLATELET # BLD AUTO: 310 K/UL
RBC # BLD: 4.39 M/UL
RBC # FLD: 13.5 %
WBC # FLD AUTO: 8.72 K/UL

## 2024-03-01 ENCOUNTER — APPOINTMENT (OUTPATIENT)
Dept: MRI IMAGING | Facility: CLINIC | Age: 74
End: 2024-03-01
Payer: MEDICARE

## 2024-03-01 ENCOUNTER — OUTPATIENT (OUTPATIENT)
Dept: OUTPATIENT SERVICES | Facility: HOSPITAL | Age: 74
LOS: 1 days | End: 2024-03-01
Payer: MEDICARE

## 2024-03-01 DIAGNOSIS — Z98.890 OTHER SPECIFIED POSTPROCEDURAL STATES: Chronic | ICD-10-CM

## 2024-03-01 DIAGNOSIS — Z98.1 ARTHRODESIS STATUS: Chronic | ICD-10-CM

## 2024-03-01 DIAGNOSIS — M17.11 UNILATERAL PRIMARY OSTEOARTHRITIS, RIGHT KNEE: ICD-10-CM

## 2024-03-01 PROCEDURE — 73721 MRI JNT OF LWR EXTRE W/O DYE: CPT | Mod: 26,RT,MH

## 2024-03-01 PROCEDURE — 73721 MRI JNT OF LWR EXTRE W/O DYE: CPT

## 2024-03-08 ENCOUNTER — TRANSCRIPTION ENCOUNTER (OUTPATIENT)
Age: 74
End: 2024-03-08

## 2024-03-13 ENCOUNTER — APPOINTMENT (OUTPATIENT)
Dept: ORTHOPEDIC SURGERY | Facility: CLINIC | Age: 74
End: 2024-03-13
Payer: MEDICARE

## 2024-03-13 VITALS — WEIGHT: 227 LBS | BODY MASS INDEX: 33.62 KG/M2 | HEIGHT: 69 IN

## 2024-03-13 PROCEDURE — 99213 OFFICE O/P EST LOW 20 MIN: CPT

## 2024-03-13 NOTE — ADDENDUM
[FreeTextEntry1] : I, Hiral Saha, acted solely as a scribe for Dr. Zafar Gimenez MD on this date  03/13/2024  All medical record entries made by the Scribe were at my, Dr. Zafar Gimenez MD , direction and personally dictated by me on 03/13/2024. I have reviewed the chart and agree that the record accurately reflects my personal performance of the history, physical exam, assessment and plan. I have also personally directed, reviewed, and agreed with the chart.

## 2024-03-13 NOTE — PHYSICAL EXAM
[de-identified] : Constitutional: Well nourished , well developed and in no acute distress Psychiatric: Alert and oriented to time place and person.Appropriate affect . Skin: Head, neck, arms and lower extremities:no lesions or discoloration HEENT: Normocephalic, EOM intact, Nasal septum midline, Respiratory: Unlabored respirations,no audible wheezing ,no tachypnea, no cyanosis Cardiovascular: No leg swelling no ankle edema no JVD, pulse regular Vascular: No calf or thigh tenderness, Peripheral pulses: intact Lymphatics: No groin adenopathy,no lymphedema lower or upper extremities  o Right Knee Exam: Inspection/Palpation : tenderness to palpation over the midline popliteal, effusion 1+, no deformity, incision well healed.  Range of Motion : 0 - 120 degrees, no crepitus Stability : Mild varus laxity instability present on provocative testing, Lachmans Test (-) Addutional tests: Flexion distraction (Minimally+) Sensation : sensation to pin intact Vascular Exam : no edema, no cyanosis, dorsalis pedis artery pulse 2+, posterior tibial artery pulse 2+ Skin : no erythema, no ecchymosis [de-identified] : o Sed Rate 02/28/24 :  Results:	  Sedimentation Rate, Erythrocyte             Final  No Documents Attached    	Test  	Result  	Flag	Reference	Goal 	WESR	22 mm/hr	H	0-20 _____________________ o C-Reactive Protein 02/28/24:  Results:	  C-Reactive Protein, Serum             Final  No Documents Attached    	Test  	Result  	Flag	Reference	Goal  	C-Reactive Protein	<3 mg/L	 	<=4	 Test Repeated  __________________________ o CBC With Auto Diff 02/28/24 :   Results Hx:	 Goal	[id number] Item Name		11:23AM	02:05PM WBC		8.72	11.34 RBC Count		4.39	4.03 HGB		13.9	13.2 HCT		42.4	41.0 Mean Cell Volume		96.6	101.7 Mean Cell Hemoglobin		31.7	32.8 Mean Cell Hemoglobin Conc		32.8	32.2 Red Cell Distrib Width		13.5	13.2 PLT		310	344 NEUT#		4.37	7.21 LYMPH#		2.89	2.86 MONO#		1.26	1.10 EOS#		0.13	0.07 BASO#		0.05	0.05 NEUT%		*50.2	*63.7 LYMPH%		33.1	25.2 MONO%		14.4	9.7 EOS%		1.5	0.6 BASO%		0.6	0.4 IMM GRAN%		*0.2	*0.4 MAN DIFF?		*N/A	*N/A _____________________________ EXAM: [id number] - MR KNEE RT  - ORDERED BY: ARLETTE HUERTAS  PROCEDURE DATE:  03/01/2024  INTERPRETATION:  MRI OF THE RIGHT KNEE  CLINICAL INDICATION: Right knee pain status post knee arthroplasty TECHNIQUE: Multiplanar, Multisequence MRI was obtained of the right knee.  COMPARISON: Right knee MRI dated 9/21/2023 and right knee radiographs dated 2/8/2024  FINDINGS:  IMPLANT: Status post total knee arthroplasty with patellar resurfacing. Hardware appears intact with near-anatomic alignment. No evidence of loosening. EXTENSOR MECHANISM: The extensor mechanism appears intact. SYNOVIUM/ JOINT FLUID: Moderate joint effusion. Trace popliteal cyst. COLLATERAL LIGAMENTS: Intact. BONE MARROW: In the medial tibial plateau abutting the tibial hardware, there is an irregular 2.3 x 2.3 x 3.7 cm PD isointense and STIR hypointense lesion with peripheral STIR hyperintensity. No surrounding marrow edema. These findings were not demonstrated on limited MRI dated 09/21/2023. MUSCLES: Within normal limits. NEUROVASCULAR STRUCTURES: Within normal limits. SUBCUTANEOUS SOFT TISSUES: Moderate pretibial soft tissue edema.  IMPRESSION: 1.  Status post right total knee arthroplasty. No evidence for hardware complication. 2.  In the medial tibial plateau abutting the tibial hardware, there is an irregular 2.3 x 2.3 x 3.7 cm PD isointense and STIR hypointense lesion with peripheral STIR hyperintensity. Findings are suspicious for avascular necrosis. Findings appear new since 09/21/2023. 3.  Moderate joint effusion.  --- End of Report --- __________________________ AP/Lateral/skyline views of the RIGHT knee obtained 02/28/2024 demonstrates total knee replacement components in good alignment, no evidence of loosening, well centered patella, loosening under femoral groove  EXAM: [id number] - MR KNEE RT - ORDERED BY: ARLETTE HUERTAS PROCEDURE DATE: 09/21/2023  INTERPRETATION: EXAMINATION: MRI of the right knee without contrast  CLINICAL INFORMATION: Right knee swelling and pain. Status post right knee replacement.  TECHNIQUE: Only motion degraded sagittal PD and axial STIR weighted images were obtained as the patient could not tolerate a complete exam.  FINDINGS: The exam is limited by motion artifact and limited sequence acquisition, as above. The patient is status post total knee arthroplasty. There is a moderate to large joint effusion. There is no displaced periprosthetic fracture. The extensor mechanism is intact. There is diffuse subcutaneous soft tissue edema. There is no well-formed collection or mass.  IMPRESSION: Limited exam, as above. Status post right total knee arthroplasty. Moderate to large joint effusion. Diffuse subcutaneous soft tissue edema without well-formed collection or mass.  --- End of Report --- _____________________________ AP/Lateral/skyline views of the RIGHT knee obtained 09/27/2023 demonstrates total knee replacement components in good alignment, no evidence of loosening, well centered patella.

## 2024-03-13 NOTE — HISTORY OF PRESENT ILLNESS
[de-identified] : LOUIE HOBSON is a 73 year old male who presents for follow up evaluation of s/p Right total knee replacement with Isidro robot on 9/14/2023. He presents ambulating independently and accompanied to the office today by his wife.  Significant improvement in preoperative pain. Patient has been attending PT with good improvements in ROM and strength. He notes difficulty when going down the stairs. Reports residual numbness and tingling along the incision site. He complains of intermittent anterolateral aspect knee pain provoked with bending the knee, such as when lying in bed at night.  His pain wakes him up at night. He is able to negotiate going down the stairs while holding on the railing. He reports weakness of the bilateral lower extremities. Denies any back pain.  He presents today with MRI results review. Since his last visit, he notes continued intermittent pain on the anterolateral aspect of his right knee. He's been taking Tylenol with no relief.

## 2024-03-13 NOTE — DISCUSSION/SUMMARY
[de-identified] : 73 year male old with s/p Right total knee replacement with Isidro robot on 9/14/2023 with minimally positive flexion distraction and mild varus laxity.   Avascular necrosis proximal tibial, flexion instability.   MRI of the right knee was discussed in great detail with the patient today. Patient with Status post right total knee arthroplasty. No evidence for hardware complication.  In the medial tibial plateau abutting the tibial hardware, there is an irregular 2.3 x 2.3 x 3.7 cm PD isointense and STIR hypointense lesion with peripheral STIR hyperintensity. Findings are suspicious for avascular necrosis. Findings appear new since 09/21/2023. Moderate joint effusion.   A script for a CT Scan of the right knee was ordered to evaluate for Avascular necrosis proximal tibial. Follow up when results are available and discuss further treatment plan.  Discussed a referral afterwards for a second opinion and further evaluation of the right knee.  The patient understood and verbally agreed to the treatment plan. All of their questions were answered. The patient should call the office if they have any questions or experience worsening symptoms.

## 2024-03-13 NOTE — REASON FOR VISIT
[Follow-Up Visit] : a follow-up visit for [Spouse] : spouse [FreeTextEntry2] : s/p Right total knee replacement with Isidro robot on 9/14/2023.

## 2024-03-14 ENCOUNTER — OUTPATIENT (OUTPATIENT)
Dept: OUTPATIENT SERVICES | Facility: HOSPITAL | Age: 74
LOS: 1 days | End: 2024-03-14
Payer: MEDICARE

## 2024-03-14 ENCOUNTER — APPOINTMENT (OUTPATIENT)
Dept: CT IMAGING | Facility: CLINIC | Age: 74
End: 2024-03-14
Payer: MEDICARE

## 2024-03-14 ENCOUNTER — RESULT REVIEW (OUTPATIENT)
Age: 74
End: 2024-03-14

## 2024-03-14 DIAGNOSIS — Z98.890 OTHER SPECIFIED POSTPROCEDURAL STATES: Chronic | ICD-10-CM

## 2024-03-14 DIAGNOSIS — Z98.1 ARTHRODESIS STATUS: Chronic | ICD-10-CM

## 2024-03-14 DIAGNOSIS — M17.11 UNILATERAL PRIMARY OSTEOARTHRITIS, RIGHT KNEE: ICD-10-CM

## 2024-03-14 PROCEDURE — 73700 CT LOWER EXTREMITY W/O DYE: CPT

## 2024-03-14 PROCEDURE — 73700 CT LOWER EXTREMITY W/O DYE: CPT | Mod: 26,RT,MH

## 2024-03-14 PROCEDURE — 76376 3D RENDER W/INTRP POSTPROCES: CPT | Mod: 26

## 2024-03-14 PROCEDURE — 76376 3D RENDER W/INTRP POSTPROCES: CPT

## 2024-03-18 ENCOUNTER — NON-APPOINTMENT (OUTPATIENT)
Age: 74
End: 2024-03-18

## 2024-03-20 ENCOUNTER — APPOINTMENT (OUTPATIENT)
Dept: ORTHOPEDIC SURGERY | Facility: CLINIC | Age: 74
End: 2024-03-20
Payer: MEDICARE

## 2024-03-20 VITALS — WEIGHT: 230 LBS | HEIGHT: 69 IN | BODY MASS INDEX: 34.07 KG/M2

## 2024-03-20 PROCEDURE — 99213 OFFICE O/P EST LOW 20 MIN: CPT

## 2024-03-20 RX ORDER — TRAMADOL HYDROCHLORIDE 50 MG/1
50 TABLET, COATED ORAL
Qty: 20 | Refills: 0 | Status: ACTIVE | COMMUNITY
Start: 2024-03-20 | End: 1900-01-01

## 2024-03-20 NOTE — PHYSICAL EXAM
[de-identified] : Constitutional: Well nourished , well developed and in no acute distress Psychiatric: Alert and oriented to time place and person.Appropriate affect . Skin: Head, neck, arms and lower extremities:no lesions or discoloration HEENT: Normocephalic, EOM intact, Nasal septum midline, Respiratory: Unlabored respirations,no audible wheezing ,no tachypnea, no cyanosis Cardiovascular: No leg swelling no ankle edema no JVD, pulse regular Vascular: No calf or thigh tenderness, Peripheral pulses: intact Lymphatics: No groin adenopathy,no lymphedema lower or upper extremities  o Right Knee Exam: Inspection/Palpation : tenderness to palpation over the course of the IT band, effusion 1+, no deformity, incision well healed.  Range of Motion : 0 - 120 degrees, no crepitus Stability : Mild varus laxity mid-range instability present on provocative testing, Lachmans Test (-) Addutional tests: Flexion distraction (Minimally+) Sensation : sensation to pin intact Vascular Exam : no edema, no cyanosis, dorsalis pedis artery pulse 2+, posterior tibial artery pulse 2+ Skin : no erythema, no ecchymosis [de-identified] : EXAM: [id number] - CT 3D RECONSTRUCT WO [place]ON  - ORDERED BY: ARLETTE HUERTAS  EXAM: [id number] - CT KNEE ONLY RT  - ORDERED BY: ARLETTE HUERTAS  PROCEDURE DATE:  03/14/2024  INTERPRETATION:  CT OF THE RIGHT KNEE  CLINICAL INFORMATION: Pain, status post total arthroplasty.  COMPARISON: Postsurgical MRI dated 3/1/2024. Presurgical CT dated 9/17/2023  TECHNIQUE: Axial CT images were obtained of the right knee with coronal and sagittal reconstructions. 3-D volume rendered reformats were also provided. No intravenous contrast was administered.  FINDINGS:  Osseous: Status post noncemented tricompartmental total arthroplasty without osteolysis, subsidence, or displaced periprosthetic fracture. Suspect subacute nondisplaced incomplete transverse stress fracture along the anteromedial cortex of the proximal tibial metaphysis. No abnormal intramedullary sclerosis along the anteromedial margin of the tibial plateau and proximal metaphysis to correlate with focal signal abnormality described on recently performed MRI. Bony mineralization otherwise within normal limits.  Soft tissues: Nonspecific small volume simple attenuation postsurgical suprapatellar effusion. No sizable hyperattenuating hematoma, distended Baker's popliteal cyst, or drainable encapsulated fluid collection. Thickening and laxity of the proximal patellar tendon with decreased postsurgical patellar tendon-patellar height ratio.  IMPRESSION:  Status post right knee total arthroplasty without convincing CT evidence for hardware loosening or proximal tibial osteonecrosis. Suspect subacute nondisplaced incomplete stress fracture along the anteromedial cortex of the proximal tibial metaphysis.  --- End of Report --- _____________________ o Sed Rate 02/28/24 :  Results:	  Sedimentation Rate, Erythrocyte             Final  No Documents Attached    	Test  	Result  	Flag	Reference	Goal 	WESR	22 mm/hr	H	0-20 _____________________ o C-Reactive Protein 02/28/24:  Results:	  C-Reactive Protein, Serum             Final  No Documents Attached    	Test  	Result  	Flag	Reference	Goal  	C-Reactive Protein	<3 mg/L	 	<=4	 Test Repeated  __________________________ o CBC With Auto Diff 02/28/24 :   Results Hx:	 Goal	[id number] Item Name		11:23AM	02:05PM WBC		8.72	11.34 RBC Count		4.39	4.03 HGB		13.9	13.2 HCT		42.4	41.0 Mean Cell Volume		96.6	101.7 Mean Cell Hemoglobin		31.7	32.8 Mean Cell Hemoglobin Conc		32.8	32.2 Red Cell Distrib Width		13.5	13.2 PLT		310	344 NEUT#		4.37	7.21 LYMPH#		2.89	2.86 MONO#		1.26	1.10 EOS#		0.13	0.07 BASO#		0.05	0.05 NEUT%		*50.2	*63.7 LYMPH%		33.1	25.2 MONO%		14.4	9.7 EOS%		1.5	0.6 BASO%		0.6	0.4 IMM GRAN%		*0.2	*0.4 MAN DIFF?		*N/A	*N/A _____________________________ EXAM: [id number] - MR KNEE RT  - ORDERED BY: ARLETTE HUERTAS  PROCEDURE DATE:  03/01/2024  INTERPRETATION:  MRI OF THE RIGHT KNEE  CLINICAL INDICATION: Right knee pain status post knee arthroplasty TECHNIQUE: Multiplanar, Multisequence MRI was obtained of the right knee.  COMPARISON: Right knee MRI dated 9/21/2023 and right knee radiographs dated 2/8/2024  FINDINGS:  IMPLANT: Status post total knee arthroplasty with patellar resurfacing. Hardware appears intact with near-anatomic alignment. No evidence of loosening. EXTENSOR MECHANISM: The extensor mechanism appears intact. SYNOVIUM/ JOINT FLUID: Moderate joint effusion. Trace popliteal cyst. COLLATERAL LIGAMENTS: Intact. BONE MARROW: In the medial tibial plateau abutting the tibial hardware, there is an irregular 2.3 x 2.3 x 3.7 cm PD isointense and STIR hypointense lesion with peripheral STIR hyperintensity. No surrounding marrow edema. These findings were not demonstrated on limited MRI dated 09/21/2023. MUSCLES: Within normal limits. NEUROVASCULAR STRUCTURES: Within normal limits. SUBCUTANEOUS SOFT TISSUES: Moderate pretibial soft tissue edema.  IMPRESSION: 1.  Status post right total knee arthroplasty. No evidence for hardware complication. 2.  In the medial tibial plateau abutting the tibial hardware, there is an irregular 2.3 x 2.3 x 3.7 cm PD isointense and STIR hypointense lesion with peripheral STIR hyperintensity. Findings are suspicious for avascular necrosis. Findings appear new since 09/21/2023. 3.  Moderate joint effusion.  --- End of Report --- __________________________ AP/Lateral/skyline views of the RIGHT knee obtained 02/28/2024 demonstrates total knee replacement components in good alignment, no evidence of loosening, well centered patella, loosening under femoral groove  EXAM: [id number] - MR KNEE RT - ORDERED BY: ARLETTE HUERTAS PROCEDURE DATE: 09/21/2023  INTERPRETATION: EXAMINATION: MRI of the right knee without contrast  CLINICAL INFORMATION: Right knee swelling and pain. Status post right knee replacement.  TECHNIQUE: Only motion degraded sagittal PD and axial STIR weighted images were obtained as the patient could not tolerate a complete exam.  FINDINGS: The exam is limited by motion artifact and limited sequence acquisition, as above. The patient is status post total knee arthroplasty. There is a moderate to large joint effusion. There is no displaced periprosthetic fracture. The extensor mechanism is intact. There is diffuse subcutaneous soft tissue edema. There is no well-formed collection or mass.  IMPRESSION: Limited exam, as above. Status post right total knee arthroplasty. Moderate to large joint effusion. Diffuse subcutaneous soft tissue edema without well-formed collection or mass.  --- End of Report --- _____________________________ AP/Lateral/skyline views of the RIGHT knee obtained 09/27/2023 demonstrates total knee replacement components in good alignment, no evidence of loosening, well centered patella.

## 2024-03-20 NOTE — ADDENDUM
[FreeTextEntry1] : I, Hiral Coffeysylvie, acted solely as a scribe for Dr. Zafar Gimenez MD on this date 03/20/2024 .  All medical record entries made by the Scribe were at my, Dr. Zafar Gimenez MD , direction and personally dictated by me on 03/20/2024 . I have reviewed the chart and agree that the record accurately reflects my personal performance of the history, physical exam, assessment and plan. I have also personally directed, reviewed, and agreed with the chart.

## 2024-03-20 NOTE — DISCUSSION/SUMMARY
[de-identified] : 73 year male old with s/p Right total knee replacement with Isidro robot on 9/14/2023 with minimally positive flexion distraction and mild varus laxity.    Labs from 2/28 were reviewed with the patient.   MRI of the right knee was discussed in great detail with the patient. Patient with Status post right total knee arthroplasty. No evidence for hardware complication.  In the medial tibial plateau abutting the tibial hardware, there is an irregular 2.3 x 2.3 x 3.7 cm PD isointense and STIR hypointense lesion with peripheral STIR hyperintensity. Findings are suspicious for avascular necrosis. Findings appear new since 09/21/2023. Moderate joint effusion.  CT Scan of the right knee was discussed in great detail with the patient. Patient with Status post right knee total arthroplasty without convincing CT evidence for hardware loosening or proximal tibial osteonecrosis. Suspect subacute nondisplaced incomplete stress fracture along the anteromedial cortex of the proximal tibial metaphysis.  Discussed a referral to Dr Marroquin for a second opinion and further evaluation of the right macoplasty knee with consistent lateral pain, effusion, tenderness over the IT band, varus laxity. XR gap distal femoral component. MRI AVN medial tibia. CT scan undisplaced fracture. Please assess.  The patient understood and verbally agreed to the treatment plan. All of their questions were answered. The patient should call the office if they have any questions or experience worsening symptoms.

## 2024-03-20 NOTE — HISTORY OF PRESENT ILLNESS
[de-identified] : LOUIE HOBSON is a 73 year old male who presents for follow up evaluation of s/p Right total knee replacement with Isidro robot on 9/14/2023. He presents ambulating independently and accompanied to the office today by his wife.  Significant improvement in preoperative pain. Patient has been attending PT with good improvements in ROM and strength. He notes difficulty when going down the stairs. Reports residual numbness and tingling along the incision site. He complains of intermittent anterolateral aspect knee pain provoked with bending the knee, such as when lying in bed at night.  His pain wakes him up at night. He is able to negotiate going down the stairs while holding onto the rail. He reports weakness of the bilateral lower extremities. Denies any back pain.  Since his last visit, he's been attending PT with minimal improvements in strength and ROM, but not in pain. Today, he complains of intermittent pain lateral right knee anterior over the patellar tendon popliteal pain with flexion. Denies any locking or giving out. Notes residual numbness asnterior aspect of the knee since the surgery.  He notes stiffness when walking and bending hte knee.

## 2024-03-21 ENCOUNTER — APPOINTMENT (OUTPATIENT)
Dept: ORTHOPEDIC SURGERY | Facility: CLINIC | Age: 74
End: 2024-03-21
Payer: MEDICARE

## 2024-03-21 VITALS
HEIGHT: 69 IN | HEART RATE: 89 BPM | BODY MASS INDEX: 34.07 KG/M2 | DIASTOLIC BLOOD PRESSURE: 76 MMHG | WEIGHT: 230 LBS | SYSTOLIC BLOOD PRESSURE: 176 MMHG

## 2024-03-21 PROCEDURE — 73562 X-RAY EXAM OF KNEE 3: CPT | Mod: RT

## 2024-03-21 PROCEDURE — 99214 OFFICE O/P EST MOD 30 MIN: CPT

## 2024-04-19 ENCOUNTER — APPOINTMENT (OUTPATIENT)
Dept: OPHTHALMOLOGY | Facility: CLINIC | Age: 74
End: 2024-04-19
Payer: MEDICARE

## 2024-04-19 ENCOUNTER — NON-APPOINTMENT (OUTPATIENT)
Age: 74
End: 2024-04-19

## 2024-04-19 PROCEDURE — 92133 CPTRZD OPH DX IMG PST SGM ON: CPT

## 2024-04-19 PROCEDURE — 92012 INTRM OPH EXAM EST PATIENT: CPT

## 2024-04-19 PROCEDURE — 92083 EXTENDED VISUAL FIELD XM: CPT

## 2024-05-16 NOTE — PHYSICAL THERAPY INITIAL EVALUATION ADULT - PATIENT/FAMILY AGREES WITH PLAN
Most recent echo in 12/2023 with LVEF of 45%.  Chronic LE edema, which is no worse than her normal.  No s/s to suggest HF at this time.  Continue on same medication regimen at this time.    yes

## 2024-06-26 ENCOUNTER — APPOINTMENT (OUTPATIENT)
Dept: ORTHOPEDIC SURGERY | Facility: CLINIC | Age: 74
End: 2024-06-26
Payer: MEDICARE

## 2024-06-26 DIAGNOSIS — Z96.651 PRESENCE OF RIGHT ARTIFICIAL KNEE JOINT: ICD-10-CM

## 2024-06-26 PROCEDURE — 99215 OFFICE O/P EST HI 40 MIN: CPT

## 2024-06-26 PROCEDURE — 73562 X-RAY EXAM OF KNEE 3: CPT | Mod: RT

## 2024-06-26 RX ORDER — CELECOXIB 200 MG/1
200 CAPSULE ORAL
Qty: 28 | Refills: 1 | Status: ACTIVE | COMMUNITY
Start: 2024-06-26 | End: 1900-01-01

## 2024-06-28 ENCOUNTER — NON-APPOINTMENT (OUTPATIENT)
Age: 74
End: 2024-06-28

## 2024-07-08 ENCOUNTER — OUTPATIENT (OUTPATIENT)
Dept: OUTPATIENT SERVICES | Facility: HOSPITAL | Age: 74
LOS: 1 days | End: 2024-07-08
Payer: MEDICARE

## 2024-07-08 VITALS
TEMPERATURE: 98 F | DIASTOLIC BLOOD PRESSURE: 86 MMHG | HEIGHT: 69.5 IN | RESPIRATION RATE: 18 BRPM | OXYGEN SATURATION: 98 % | SYSTOLIC BLOOD PRESSURE: 154 MMHG | WEIGHT: 235.01 LBS | HEART RATE: 82 BPM

## 2024-07-08 DIAGNOSIS — T84.84XA PAIN DUE TO INTERNAL ORTHOPEDIC PROSTHETIC DEVICES, IMPLANTS AND GRAFTS, INITIAL ENCOUNTER: ICD-10-CM

## 2024-07-08 DIAGNOSIS — Z98.890 OTHER SPECIFIED POSTPROCEDURAL STATES: Chronic | ICD-10-CM

## 2024-07-08 DIAGNOSIS — Z96.651 PRESENCE OF RIGHT ARTIFICIAL KNEE JOINT: Chronic | ICD-10-CM

## 2024-07-08 DIAGNOSIS — Z98.1 ARTHRODESIS STATUS: Chronic | ICD-10-CM

## 2024-07-08 DIAGNOSIS — Z29.9 ENCOUNTER FOR PROPHYLACTIC MEASURES, UNSPECIFIED: ICD-10-CM

## 2024-07-08 LAB
ANION GAP SERPL CALC-SCNC: 18 MMOL/L — HIGH (ref 5–17)
BLD GP AB SCN SERPL QL: NEGATIVE — SIGNIFICANT CHANGE UP
BUN SERPL-MCNC: 16 MG/DL — SIGNIFICANT CHANGE UP (ref 7–23)
CALCIUM SERPL-MCNC: 10.4 MG/DL — SIGNIFICANT CHANGE UP (ref 8.4–10.5)
CHLORIDE SERPL-SCNC: 102 MMOL/L — SIGNIFICANT CHANGE UP (ref 96–108)
CO2 SERPL-SCNC: 21 MMOL/L — LOW (ref 22–31)
CREAT SERPL-MCNC: 0.71 MG/DL — SIGNIFICANT CHANGE UP (ref 0.5–1.3)
EGFR: 97 ML/MIN/1.73M2 — SIGNIFICANT CHANGE UP
GLUCOSE SERPL-MCNC: 99 MG/DL — SIGNIFICANT CHANGE UP (ref 70–99)
HCT VFR BLD CALC: 42.5 % — SIGNIFICANT CHANGE UP (ref 39–50)
HGB BLD-MCNC: 13.7 G/DL — SIGNIFICANT CHANGE UP (ref 13–17)
MCHC RBC-ENTMCNC: 31.9 PG — SIGNIFICANT CHANGE UP (ref 27–34)
MCHC RBC-ENTMCNC: 32.2 GM/DL — SIGNIFICANT CHANGE UP (ref 32–36)
MCV RBC AUTO: 99.1 FL — SIGNIFICANT CHANGE UP (ref 80–100)
NRBC # BLD: 0 /100 WBCS — SIGNIFICANT CHANGE UP (ref 0–0)
PLATELET # BLD AUTO: 287 K/UL — SIGNIFICANT CHANGE UP (ref 150–400)
POTASSIUM SERPL-MCNC: 4.1 MMOL/L — SIGNIFICANT CHANGE UP (ref 3.5–5.3)
POTASSIUM SERPL-SCNC: 4.1 MMOL/L — SIGNIFICANT CHANGE UP (ref 3.5–5.3)
RBC # BLD: 4.29 M/UL — SIGNIFICANT CHANGE UP (ref 4.2–5.8)
RBC # FLD: 12.8 % — SIGNIFICANT CHANGE UP (ref 10.3–14.5)
RH IG SCN BLD-IMP: POSITIVE — SIGNIFICANT CHANGE UP
SODIUM SERPL-SCNC: 141 MMOL/L — SIGNIFICANT CHANGE UP (ref 135–145)
WBC # BLD: 8.78 K/UL — SIGNIFICANT CHANGE UP (ref 3.8–10.5)
WBC # FLD AUTO: 8.78 K/UL — SIGNIFICANT CHANGE UP (ref 3.8–10.5)

## 2024-07-08 PROCEDURE — 86850 RBC ANTIBODY SCREEN: CPT

## 2024-07-08 PROCEDURE — 83036 HEMOGLOBIN GLYCOSYLATED A1C: CPT

## 2024-07-08 PROCEDURE — 87640 STAPH A DNA AMP PROBE: CPT

## 2024-07-08 PROCEDURE — G0463: CPT

## 2024-07-08 PROCEDURE — 86901 BLOOD TYPING SEROLOGIC RH(D): CPT

## 2024-07-08 PROCEDURE — 87641 MR-STAPH DNA AMP PROBE: CPT

## 2024-07-08 PROCEDURE — 85027 COMPLETE CBC AUTOMATED: CPT

## 2024-07-08 PROCEDURE — 80048 BASIC METABOLIC PNL TOTAL CA: CPT

## 2024-07-08 PROCEDURE — 86900 BLOOD TYPING SEROLOGIC ABO: CPT

## 2024-07-08 NOTE — H&P PST ADULT - NEUROLOGICAL COMMENTS
numbness/tingling in right knee and sometimes down the right shin, using cane to ambulate since surgery in 9/2023 for longer walks

## 2024-07-08 NOTE — H&P PST ADULT - NSANTHOSAYNRD_GEN_A_CORE
No. MIRYAM screening performed.  STOP BANG Legend: 0-2 = LOW Risk; 3-4 = INTERMEDIATE Risk; 5-8 = HIGH Risk

## 2024-07-08 NOTE — H&P PST ADULT - NSICDXPASTMEDICALHX_GEN_ALL_CORE_FT
PAST MEDICAL HISTORY:  COVID-19 vaccine series completed     Dyslipidemia     Dyspnea on exertion     History of COVID-19     History of nocturia     HLD (hyperlipidemia)     Lumbar herniated disc     Osteoarthritis     Primary osteoarthritis of right knee     Right knee pain

## 2024-07-08 NOTE — H&P PST ADULT - NSICDXFAMILYHX_GEN_ALL_CORE_FT
FAMILY HISTORY:  Father  Still living? No  Family history of obesity, Age at diagnosis: Age Unknown  FH: diabetes mellitus, Age at diagnosis: Age Unknown    Mother  Still living? No  Family history of breast cancer, Age at diagnosis: Age Unknown    Sibling  Still living? Yes, Estimated age: 81-90  Family history of colon cancer, Age at diagnosis: Age Unknown  FH: diabetes mellitus, Age at diagnosis: Age Unknown  FH: heart attack, Age at diagnosis: Age Unknown    Child  Still living? Unknown  FH: diabetes mellitus, Age at diagnosis: Age Unknown

## 2024-07-08 NOTE — H&P PST ADULT - PROBLEM SELECTOR PLAN 1
Revision - right total knee arthroplasty on 7/23/24 with Dr. Ct Uriarte.  Pre-op instructions given. Questions answered.  Medical evaluation on 7/10/24.

## 2024-07-08 NOTE — H&P PST ADULT - ASSESSMENT
DASI score: 6.5  DASI activity: mows lawn, vacuums at home, goes food shopping  Loose teeth or denture: full upper denture partial lower denture    CAPRINI SCORE [CLOT]    AGE RELATED RISK FACTORS                                                       MOBILITY RELATED FACTORS  [ ] Age 41-60 years                                            (1 Point)                  [ ] Bed rest                                                        (1 Point)  [ X] Age: 61-74 years                                           (2 Points)                 [ ] Plaster cast                                                   (2 Points)  [ ] Age= 75 years                                              (3 Points)                 [ ] Bed bound for more than 72 hours                 (2 Points)    DISEASE RELATED RISK FACTORS                                               GENDER SPECIFIC FACTORS  [ ] Edema in the lower extremities                       (1 Point)                  [ ] Pregnancy                                                     (1 Point)  [ ] Varicose veins                                               (1 Point)                  [ ] Post-partum < 6 weeks                                   (1 Point)             [ X] BMI > 25 Kg/m2                                            (1 Point)                  [ ] Hormonal therapy  or oral contraception          (1 Point)                 [ ] Sepsis (in the previous month)                        (1 Point)                  [ ] History of pregnancy complications                 (1 point)  [ ] Pneumonia or serious lung disease                                               [ ] Unexplained or recurrent                     (1 Point)           (in the previous month)                               (1 Point)  [ ] Abnormal pulmonary function test                     (1 Point)                 SURGERY RELATED RISK FACTORS  [ ] Acute myocardial infarction                              (1 Point)                 [ ]  Section                                             (1 Point)  [ ] Congestive heart failure (in the previous month)  (1 Point)               [ ] Minor surgery                                                  (1 Point)   [ ] Inflammatory bowel disease                             (1 Point)                 [ ] Arthroscopic surgery                                        (2 Points)  [ ] Central venous access                                      (2 Points)                [ ] General surgery lasting more than 45 minutes   (2 Points)       [ ] Stroke (in the previous month)                          (5 Points)               [ X] Elective arthroplasty                                         (5 Points)                                                                                                                                               HEMATOLOGY RELATED FACTORS                                                 TRAUMA RELATED RISK FACTORS  [ ] Prior episodes of VTE                                     (3 Points)                [ ] Fracture of the hip, pelvis, or leg                       (5 Points)  [ ] Positive family history for VTE                         (3 Points)                 [ ] Acute spinal cord injury (in the previous month)  (5 Points)  [ ] Prothrombin 43670 A                                     (3 Points)                 [ ] Paralysis  (less than 1 month)                             (5 Points)  [ ] Factor V Leiden                                             (3 Points)                  [ ] Multiple Trauma within 1 month                        (5 Points)  [ ] Lupus anticoagulants                                     (3 Points)                                                           [ ] Anticardiolipin antibodies                               (3 Points)                                                       [ ] High homocysteine in the blood                      (3 Points)                                             [ ] Other congenital or acquired thrombophilia      (3 Points)                                                [ ] Heparin induced thrombocytopenia                  (3 Points)                                          Total Score [      8    ]    Caprini Score 0 - 2:  Low Risk, No VTE Prophylaxis required for most patients, encourage ambulation  Caprini Score 3 - 6:  At Risk, pharmacologic VTE prophylaxis is indicated for most patients (in the absence of a contraindication)  Caprini Score Greater than or = 7:  High Risk, pharmacologic VTE prophylaxis is indicated for most patients (in the absence of a contraindication)

## 2024-07-08 NOTE — H&P PST ADULT - CARDIOVASCULAR COMMENTS
attributes to weight gain of 10 lbs, "pain when walking makes it hard to breathe" attributes to weight gain of 10 lbs and "pain when walking makes it hard to breathe"

## 2024-07-08 NOTE — H&P PST ADULT - HISTORY OF PRESENT ILLNESS
74 yo male presents with 1 year history of right knee pain. He was treated in the past with "gel shots" with only 2-4 days of relief. Pain now 5/10 at rest and increased to 10/10 with activity. Also reports swelling and stiffness. Pain relieved with with rest and tylenol. Now uses a cane and wears a knee brace for ambulation 74 yo male presents to [facility] prior to scheduled Revision of right total knee arthroplasty on 7/23/24 with Dr. Ct Uriarte. Pmhx includes obesity (BMI 34), lumbar herniated disc s/p lumbar fusion (2007), OA, right knee replacement on 9/14/23 (Dr. Gimenez), HLD, mild glaucoma, nuclear sclerosis, former smoker (quit 2020, 42+ pack years). Reports right knee pain and "cracking" of right knee; worsening over the past 6 months. Endorses swelling of right knee. GABRIEL upon exertion which he associates w/ pain (uses cane to ambulate) and weight gain (10 lbs). Denies chest pain, palpitations, sob at rest, dizziness, syncope, fever or chills.

## 2024-07-08 NOTE — H&P PST ADULT - OTHER CARE PROVIDERS
Cardiologist - Dr. De La O - last visit in 8/2023 Cardiologist - Dr. De La O - last visit in 8/2023, Vascular - Dr. Michael Adair [phone] - seen ~11/2023

## 2024-07-08 NOTE — H&P PST ADULT - NSICDXPASTSURGICALHX_GEN_ALL_CORE_FT
PAST SURGICAL HISTORY:  H/O colonoscopy     H/O total knee replacement, right     History of arthroscopy of left knee     History of arthroscopy of right knee     History of lumbar spinal fusion     History of repair of left rotator cuff

## 2024-07-09 LAB
A1C WITH ESTIMATED AVERAGE GLUCOSE RESULT: 5.8 % — HIGH (ref 4–5.6)
ESTIMATED AVERAGE GLUCOSE: 120 MG/DL — HIGH (ref 68–114)
MRSA PCR RESULT.: SIGNIFICANT CHANGE UP
S AUREUS DNA NOSE QL NAA+PROBE: SIGNIFICANT CHANGE UP

## 2024-07-10 ENCOUNTER — APPOINTMENT (OUTPATIENT)
Dept: INTERNAL MEDICINE | Facility: CLINIC | Age: 74
End: 2024-07-10
Payer: MEDICARE

## 2024-07-10 ENCOUNTER — NON-APPOINTMENT (OUTPATIENT)
Age: 74
End: 2024-07-10

## 2024-07-10 VITALS — SYSTOLIC BLOOD PRESSURE: 144 MMHG | DIASTOLIC BLOOD PRESSURE: 80 MMHG

## 2024-07-10 VITALS
WEIGHT: 238 LBS | DIASTOLIC BLOOD PRESSURE: 78 MMHG | HEART RATE: 78 BPM | SYSTOLIC BLOOD PRESSURE: 172 MMHG | OXYGEN SATURATION: 95 % | HEIGHT: 69 IN | BODY MASS INDEX: 35.25 KG/M2

## 2024-07-10 VITALS — SYSTOLIC BLOOD PRESSURE: 158 MMHG | DIASTOLIC BLOOD PRESSURE: 80 MMHG

## 2024-07-10 DIAGNOSIS — M17.11 UNILATERAL PRIMARY OSTEOARTHRITIS, RIGHT KNEE: ICD-10-CM

## 2024-07-10 DIAGNOSIS — Z96.651 PRESENCE OF RIGHT ARTIFICIAL KNEE JOINT: ICD-10-CM

## 2024-07-10 DIAGNOSIS — Z01.818 ENCOUNTER FOR OTHER PREPROCEDURAL EXAMINATION: ICD-10-CM

## 2024-07-10 PROCEDURE — 93000 ELECTROCARDIOGRAM COMPLETE: CPT

## 2024-07-10 PROCEDURE — 99214 OFFICE O/P EST MOD 30 MIN: CPT

## 2024-07-10 PROCEDURE — G2211 COMPLEX E/M VISIT ADD ON: CPT

## 2024-07-22 ENCOUNTER — TRANSCRIPTION ENCOUNTER (OUTPATIENT)
Age: 74
End: 2024-07-22

## 2024-07-23 ENCOUNTER — APPOINTMENT (OUTPATIENT)
Dept: ORTHOPEDIC SURGERY | Facility: HOSPITAL | Age: 74
End: 2024-07-23

## 2024-07-23 ENCOUNTER — TRANSCRIPTION ENCOUNTER (OUTPATIENT)
Age: 74
End: 2024-07-23

## 2024-07-24 ENCOUNTER — TRANSCRIPTION ENCOUNTER (OUTPATIENT)
Age: 74
End: 2024-07-24

## 2024-07-26 PROBLEM — E78.5 HYPERLIPIDEMIA, UNSPECIFIED: Chronic | Status: ACTIVE | Noted: 2024-07-08

## 2024-07-26 PROBLEM — M51.26 OTHER INTERVERTEBRAL DISC DISPLACEMENT, LUMBAR REGION: Chronic | Status: ACTIVE | Noted: 2024-07-08

## 2024-07-26 PROBLEM — M25.561 PAIN IN RIGHT KNEE: Chronic | Status: ACTIVE | Noted: 2024-07-08

## 2024-07-30 ENCOUNTER — NON-APPOINTMENT (OUTPATIENT)
Age: 74
End: 2024-07-30

## 2024-08-01 ENCOUNTER — NON-APPOINTMENT (OUTPATIENT)
Age: 74
End: 2024-08-01

## 2024-08-02 ENCOUNTER — APPOINTMENT (OUTPATIENT)
Dept: ORTHOPEDIC SURGERY | Facility: CLINIC | Age: 74
End: 2024-08-02
Payer: MEDICARE

## 2024-08-02 VITALS — HEIGHT: 69 IN | BODY MASS INDEX: 35.25 KG/M2 | WEIGHT: 238 LBS

## 2024-08-02 DIAGNOSIS — Z96.651 PRESENCE OF RIGHT ARTIFICIAL KNEE JOINT: ICD-10-CM

## 2024-08-02 PROCEDURE — 73562 X-RAY EXAM OF KNEE 3: CPT | Mod: RT

## 2024-08-02 PROCEDURE — 99024 POSTOP FOLLOW-UP VISIT: CPT

## 2024-08-02 RX ORDER — TRAMADOL HYDROCHLORIDE 50 MG/1
50 TABLET, COATED ORAL
Qty: 14 | Refills: 0 | Status: ACTIVE | COMMUNITY
Start: 2024-08-02 | End: 1900-01-01

## 2024-08-02 RX ORDER — OXYCODONE 5 MG/1
5 TABLET ORAL
Qty: 14 | Refills: 0 | Status: ACTIVE | COMMUNITY
Start: 2024-08-02 | End: 1900-01-01

## 2024-08-27 ENCOUNTER — NON-APPOINTMENT (OUTPATIENT)
Age: 74
End: 2024-08-27

## 2024-09-23 NOTE — CASE MANAGEMENT PROGRESS NOTE - NSCMPROGRESSNOTE_GEN_ALL_CORE
CM sent rx for commode to [facility] [phone] to deliver to patients home.  Attending Attestation (For Attendings USE Only)...

## 2024-10-03 ENCOUNTER — APPOINTMENT (OUTPATIENT)
Dept: OPHTHALMOLOGY | Facility: CLINIC | Age: 74
End: 2024-10-03
Payer: MEDICARE

## 2024-10-03 ENCOUNTER — NON-APPOINTMENT (OUTPATIENT)
Age: 74
End: 2024-10-03

## 2024-10-03 PROCEDURE — 92133 CPTRZD OPH DX IMG PST SGM ON: CPT

## 2024-10-03 PROCEDURE — 92014 COMPRE OPH EXAM EST PT 1/>: CPT

## 2024-10-09 NOTE — H&P PST ADULT - NEUROLOGICAL
no hearing difficulty/no sinus symptoms/no dysphagia negative sensation intact/responds to pain/responds to verbal commands

## 2024-11-01 ENCOUNTER — APPOINTMENT (OUTPATIENT)
Dept: ORTHOPEDIC SURGERY | Facility: CLINIC | Age: 74
End: 2024-11-01
Payer: MEDICARE

## 2024-11-01 VITALS — WEIGHT: 232 LBS | BODY MASS INDEX: 34.36 KG/M2 | HEIGHT: 69 IN

## 2024-11-01 DIAGNOSIS — Z96.651 PRESENCE OF RIGHT ARTIFICIAL KNEE JOINT: ICD-10-CM

## 2024-11-01 PROCEDURE — 99214 OFFICE O/P EST MOD 30 MIN: CPT

## 2024-11-01 PROCEDURE — 73562 X-RAY EXAM OF KNEE 3: CPT | Mod: RT

## 2024-11-20 ENCOUNTER — NON-APPOINTMENT (OUTPATIENT)
Age: 74
End: 2024-11-20

## 2024-12-04 ENCOUNTER — APPOINTMENT (OUTPATIENT)
Dept: INTERNAL MEDICINE | Facility: CLINIC | Age: 74
End: 2024-12-04

## 2024-12-04 VITALS
BODY MASS INDEX: 35.31 KG/M2 | SYSTOLIC BLOOD PRESSURE: 170 MMHG | HEIGHT: 68 IN | HEART RATE: 76 BPM | WEIGHT: 233 LBS | OXYGEN SATURATION: 95 % | DIASTOLIC BLOOD PRESSURE: 80 MMHG

## 2024-12-04 VITALS — DIASTOLIC BLOOD PRESSURE: 78 MMHG | SYSTOLIC BLOOD PRESSURE: 138 MMHG

## 2024-12-04 DIAGNOSIS — Z00.00 ENCOUNTER FOR GENERAL ADULT MEDICAL EXAMINATION W/OUT ABNORMAL FINDINGS: ICD-10-CM

## 2024-12-04 LAB
25(OH)D3 SERPL-MCNC: 22.6 NG/ML
ALBUMIN SERPL ELPH-MCNC: 5 G/DL
ALP BLD-CCNC: 91 U/L
ALT SERPL-CCNC: 16 U/L
ANION GAP SERPL CALC-SCNC: 18 MMOL/L
AST SERPL-CCNC: 16 U/L
BASOPHILS # BLD AUTO: 0.04 K/UL
BASOPHILS NFR BLD AUTO: 0.5 %
BILIRUB SERPL-MCNC: 0.5 MG/DL
BUN SERPL-MCNC: 12 MG/DL
CALCIUM SERPL-MCNC: 10.2 MG/DL
CHLORIDE SERPL-SCNC: 104 MMOL/L
CHOLEST SERPL-MCNC: 221 MG/DL
CO2 SERPL-SCNC: 22 MMOL/L
CREAT SERPL-MCNC: 0.72 MG/DL
EGFR: 96 ML/MIN/1.73M2
EOSINOPHIL # BLD AUTO: 0.12 K/UL
EOSINOPHIL NFR BLD AUTO: 1.5 %
ESTIMATED AVERAGE GLUCOSE: 123 MG/DL
GLUCOSE SERPL-MCNC: 125 MG/DL
HBA1C MFR BLD HPLC: 5.9 %
HCT VFR BLD CALC: 45.7 %
HDLC SERPL-MCNC: 63 MG/DL
HGB BLD-MCNC: 14.5 G/DL
IMM GRANULOCYTES NFR BLD AUTO: 0.4 %
LDLC SERPL CALC-MCNC: 110 MG/DL
LYMPHOCYTES # BLD AUTO: 1.99 K/UL
LYMPHOCYTES NFR BLD AUTO: 24.3 %
MAN DIFF?: NORMAL
MCHC RBC-ENTMCNC: 31.3 PG
MCHC RBC-ENTMCNC: 31.7 G/DL
MCV RBC AUTO: 98.5 FL
MONOCYTES # BLD AUTO: 0.98 K/UL
MONOCYTES NFR BLD AUTO: 12 %
NEUTROPHILS # BLD AUTO: 5.03 K/UL
NEUTROPHILS NFR BLD AUTO: 61.3 %
NONHDLC SERPL-MCNC: 158 MG/DL
PLATELET # BLD AUTO: 297 K/UL
POTASSIUM SERPL-SCNC: 4.9 MMOL/L
PROT SERPL-MCNC: 7.8 G/DL
RBC # BLD: 4.64 M/UL
RBC # FLD: 14.4 %
SODIUM SERPL-SCNC: 144 MMOL/L
TRIGL SERPL-MCNC: 284 MG/DL
TSH SERPL-ACNC: 1.52 UIU/ML
WBC # FLD AUTO: 8.19 K/UL

## 2024-12-04 PROCEDURE — 36415 COLL VENOUS BLD VENIPUNCTURE: CPT

## 2024-12-04 PROCEDURE — G0439: CPT

## 2024-12-04 RX ORDER — ATORVASTATIN CALCIUM 20 MG/1
20 TABLET, FILM COATED ORAL
Refills: 0 | Status: ACTIVE | COMMUNITY

## 2024-12-05 LAB
HCV AB SER QL: NONREACTIVE
HCV S/CO RATIO: 0.24 S/CO
HIV1+2 AB SPEC QL IA.RAPID: NONREACTIVE

## 2024-12-30 DIAGNOSIS — R73.03 PREDIABETES.: ICD-10-CM

## 2025-01-04 ENCOUNTER — NON-APPOINTMENT (OUTPATIENT)
Age: 75
End: 2025-01-04

## 2025-01-20 NOTE — OCCUPATIONAL THERAPY INITIAL EVALUATION ADULT - ASSISTIVE DEVICE FOR TRANSFER: SIT/STAND, REHAB EVAL
Caller: Michael Jackson    Relationship: Self    Best call back number: [phone]    What medication are you requesting: ALTERNATIVE TO IODINE FOR CT     What are your current symptoms: NA    How long have you been experiencing symptoms: NA    Have you had these symptoms before:    [x] Yes  [] No    Have you been treated for these symptoms before:   [x] Yes  [] No    If a prescription is needed, what is your preferred pharmacy and phone number: [facility] [address] [place], [place] - [address] - [phone]  - [phone] FX     Additional notes:  PATIENT WOULD LIKE TO USE A ALTERNATIVE TO IODINE FOR THE CT OF CHEST ON 1/27. IF OKAY PATIENT WOULD LIKE A CALL TO GO OVER WHAT HE IS SUPPOSED TO DO WITH IT AND WHEN TO START USING IT BEFORE THE CT     PLEASE CALL TO CONFIRM OR DENY        rolling walker

## 2025-02-21 NOTE — H&P PST ADULT - NS PRO AD PATIENT TYPE
Spoke with patient regarding procedure scheduled on 3.7     Arrival time 0645     Has patient been sick with fever or on antibiotics within the last 7 days? No     Does the patient have any open wounds, sores or rashes? No     Does the patient have any recent fractures? no     Has patient received a vaccination within the last 7 days? No     Received the COVID vaccination? yes     Has the patient stopped all medications as directed? na     Does patient have a pacemaker, defibrillator, or implantable stimulator? No     Does the patient have a ride to and from procedure and someone reliable to remain with patient?  FIANCE      Is the patient diabetic? no      Does the patient have sleep apnea? Or use O2 at home? no     Is the patient receiving sedation? Yes      Is the patient instructed to remain NPO beginning at midnight the night before their procedure? yes     Procedure location confirmed with patient? Yes     Covid- Denies signs/symptoms. Instructed to notify PAT/MD if any changes.   Health Care Proxy (HCP)

## 2025-04-02 ENCOUNTER — NON-APPOINTMENT (OUTPATIENT)
Age: 75
End: 2025-04-02

## 2025-04-03 ENCOUNTER — NON-APPOINTMENT (OUTPATIENT)
Age: 75
End: 2025-04-03

## 2025-04-03 ENCOUNTER — APPOINTMENT (OUTPATIENT)
Dept: OPHTHALMOLOGY | Facility: CLINIC | Age: 75
End: 2025-04-03
Payer: MEDICARE

## 2025-04-03 PROCEDURE — 99214 OFFICE O/P EST MOD 30 MIN: CPT

## 2025-04-03 PROCEDURE — 92134 CPTRZ OPH DX IMG PST SGM RTA: CPT

## 2025-04-03 PROCEDURE — 92136 OPHTHALMIC BIOMETRY: CPT

## 2025-04-03 PROCEDURE — 92083 EXTENDED VISUAL FIELD XM: CPT

## 2025-04-28 ENCOUNTER — NON-APPOINTMENT (OUTPATIENT)
Age: 75
End: 2025-04-28

## 2025-04-30 ENCOUNTER — NON-APPOINTMENT (OUTPATIENT)
Age: 75
End: 2025-04-30

## 2025-04-30 ENCOUNTER — APPOINTMENT (OUTPATIENT)
Dept: INTERNAL MEDICINE | Facility: CLINIC | Age: 75
End: 2025-04-30
Payer: MEDICARE

## 2025-04-30 VITALS — SYSTOLIC BLOOD PRESSURE: 138 MMHG | DIASTOLIC BLOOD PRESSURE: 78 MMHG

## 2025-04-30 VITALS
HEIGHT: 68 IN | SYSTOLIC BLOOD PRESSURE: 152 MMHG | OXYGEN SATURATION: 95 % | WEIGHT: 235 LBS | BODY MASS INDEX: 35.61 KG/M2 | HEART RATE: 87 BPM | DIASTOLIC BLOOD PRESSURE: 72 MMHG

## 2025-04-30 DIAGNOSIS — H26.9 UNSPECIFIED CATARACT: ICD-10-CM

## 2025-04-30 DIAGNOSIS — Z01.818 ENCOUNTER FOR OTHER PREPROCEDURAL EXAMINATION: ICD-10-CM

## 2025-04-30 PROCEDURE — 93000 ELECTROCARDIOGRAM COMPLETE: CPT

## 2025-04-30 PROCEDURE — 99214 OFFICE O/P EST MOD 30 MIN: CPT

## 2025-05-07 ENCOUNTER — APPOINTMENT (OUTPATIENT)
Dept: OPHTHALMOLOGY | Facility: AMBULATORY SURGERY CENTER | Age: 75
End: 2025-05-07
Payer: MEDICARE

## 2025-05-07 PROCEDURE — 66984 XCAPSL CTRC RMVL W/O ECP: CPT | Mod: RT

## 2025-05-08 ENCOUNTER — APPOINTMENT (OUTPATIENT)
Dept: OPHTHALMOLOGY | Facility: CLINIC | Age: 75
End: 2025-05-08
Payer: MEDICARE

## 2025-05-08 ENCOUNTER — NON-APPOINTMENT (OUTPATIENT)
Age: 75
End: 2025-05-08

## 2025-05-08 PROCEDURE — 99024 POSTOP FOLLOW-UP VISIT: CPT

## 2025-05-14 ENCOUNTER — APPOINTMENT (OUTPATIENT)
Dept: OPHTHALMOLOGY | Facility: CLINIC | Age: 75
End: 2025-05-14
Payer: MEDICARE

## 2025-05-14 PROCEDURE — 99024 POSTOP FOLLOW-UP VISIT: CPT

## 2025-05-14 PROCEDURE — 92134 CPTRZ OPH DX IMG PST SGM RTA: CPT

## 2025-05-27 ENCOUNTER — APPOINTMENT (OUTPATIENT)
Dept: OPHTHALMOLOGY | Facility: AMBULATORY SURGERY CENTER | Age: 75
End: 2025-05-27

## 2025-05-27 PROCEDURE — 66984 XCAPSL CTRC RMVL W/O ECP: CPT | Mod: LT,79

## 2025-05-28 ENCOUNTER — APPOINTMENT (OUTPATIENT)
Dept: OPHTHALMOLOGY | Facility: CLINIC | Age: 75
End: 2025-05-28
Payer: MEDICARE

## 2025-05-28 ENCOUNTER — NON-APPOINTMENT (OUTPATIENT)
Age: 75
End: 2025-05-28

## 2025-05-28 PROCEDURE — 99024 POSTOP FOLLOW-UP VISIT: CPT

## 2025-06-06 ENCOUNTER — APPOINTMENT (OUTPATIENT)
Dept: INTERNAL MEDICINE | Facility: CLINIC | Age: 75
End: 2025-06-06
Payer: MEDICARE

## 2025-06-06 VITALS
OXYGEN SATURATION: 97 % | BODY MASS INDEX: 35.58 KG/M2 | WEIGHT: 234 LBS | HEART RATE: 87 BPM | DIASTOLIC BLOOD PRESSURE: 89 MMHG | SYSTOLIC BLOOD PRESSURE: 156 MMHG

## 2025-06-06 VITALS — DIASTOLIC BLOOD PRESSURE: 76 MMHG | SYSTOLIC BLOOD PRESSURE: 138 MMHG

## 2025-06-06 PROCEDURE — 99213 OFFICE O/P EST LOW 20 MIN: CPT

## 2025-06-06 PROCEDURE — G2211 COMPLEX E/M VISIT ADD ON: CPT

## 2025-06-11 ENCOUNTER — TRANSCRIPTION ENCOUNTER (OUTPATIENT)
Age: 75
End: 2025-06-11

## 2025-06-11 ENCOUNTER — APPOINTMENT (OUTPATIENT)
Dept: OPHTHALMOLOGY | Facility: CLINIC | Age: 75
End: 2025-06-11
Payer: MEDICARE

## 2025-06-11 ENCOUNTER — NON-APPOINTMENT (OUTPATIENT)
Age: 75
End: 2025-06-11

## 2025-06-11 LAB
ALBUMIN SERPL ELPH-MCNC: 4.7 G/DL
ALP BLD-CCNC: 101 U/L
ALT SERPL-CCNC: 24 U/L
ANION GAP SERPL CALC-SCNC: 17 MMOL/L
AST SERPL-CCNC: 21 U/L
BILIRUB SERPL-MCNC: 0.4 MG/DL
BUN SERPL-MCNC: 15 MG/DL
CALCIUM SERPL-MCNC: 9.8 MG/DL
CHLORIDE SERPL-SCNC: 103 MMOL/L
CHOLEST SERPL-MCNC: 212 MG/DL
CO2 SERPL-SCNC: 20 MMOL/L
CREAT SERPL-MCNC: 0.79 MG/DL
EGFRCR SERPLBLD CKD-EPI 2021: 94 ML/MIN/1.73M2
ESTIMATED AVERAGE GLUCOSE: 126 MG/DL
GLUCOSE SERPL-MCNC: 135 MG/DL
HBA1C MFR BLD HPLC: 6 %
HDLC SERPL-MCNC: 60 MG/DL
LDLC SERPL-MCNC: 102 MG/DL
NONHDLC SERPL-MCNC: 153 MG/DL
POTASSIUM SERPL-SCNC: 4.4 MMOL/L
PROT SERPL-MCNC: 7.7 G/DL
SODIUM SERPL-SCNC: 139 MMOL/L
TRIGL SERPL-MCNC: 298 MG/DL

## 2025-06-11 PROCEDURE — 92134 CPTRZ OPH DX IMG PST SGM RTA: CPT

## 2025-06-11 PROCEDURE — 99024 POSTOP FOLLOW-UP VISIT: CPT

## 2025-06-23 ENCOUNTER — NON-APPOINTMENT (OUTPATIENT)
Age: 75
End: 2025-06-23

## 2025-07-01 ENCOUNTER — APPOINTMENT (OUTPATIENT)
Dept: OPHTHALMOLOGY | Facility: CLINIC | Age: 75
End: 2025-07-01
Payer: MEDICARE

## 2025-07-01 ENCOUNTER — NON-APPOINTMENT (OUTPATIENT)
Age: 75
End: 2025-07-01

## 2025-07-01 PROCEDURE — 92134 CPTRZ OPH DX IMG PST SGM RTA: CPT

## 2025-07-01 PROCEDURE — 99024 POSTOP FOLLOW-UP VISIT: CPT

## (undated) DEVICE — TOURNIQUET CUFF 34" DUAL PORT W PLC

## (undated) DEVICE — GLV 8 PROTEXIS (BLUE)

## (undated) DEVICE — DRAPE 3/4 SHEET 52X76"

## (undated) DEVICE — MAKO BLADE NARROW

## (undated) DEVICE — DRAPE SPLIT SHEET 77" X 120"

## (undated) DEVICE — SOL IRR BAG NS 0.9% 3000ML

## (undated) DEVICE — SPECIMEN CONTAINER PET

## (undated) DEVICE — MAKO VIZADISC KNEE TRACKING KIT

## (undated) DEVICE — SOL IRR POUR H2O 1500ML

## (undated) DEVICE — PACK TOTAL KNEE

## (undated) DEVICE — HOOD FLYTE STRYKER HELMET SHIELD

## (undated) DEVICE — MAKO CHECKPOINT KIT FEMORAL / TIBIAL

## (undated) DEVICE — CRYO/CUFF GRAVITY COOLER KNEE LARGE

## (undated) DEVICE — DRAIN JACKSON PRATT 3 SPRING RESERVOIR W 10FR PVC DRAIN

## (undated) DEVICE — SUT MONOSOF 3-0 30" C-16

## (undated) DEVICE — SUT POLYSORB 2-0 30" GS-11 UNDYED

## (undated) DEVICE — ELCTR STRYKER NEPTUNE SMOKE EVACUATION PENCIL (GREEN)

## (undated) DEVICE — DRSG SILVERLON ISLAND 4X14" 2X12" PAD

## (undated) DEVICE — HOOD T5 PEELAWAY

## (undated) DEVICE — MAKO DRAPE KIT

## (undated) DEVICE — VISITEC 4X4

## (undated) DEVICE — DRAPE INSTRUMENT POUCH 6.75" X 11"

## (undated) DEVICE — MARKING PEN W RULER

## (undated) DEVICE — BAG SPONGE COUNTER EZ

## (undated) DEVICE — GLV 8 PROTEXIS (WHITE)

## (undated) DEVICE — POSITIONER STIRRUP STRAP W SLIP RING 19X3.5"

## (undated) DEVICE — MAKO BLADE STANDARD

## (undated) DEVICE — WOUND IRR SURGIPHOR

## (undated) DEVICE — POSITIONER STRAP ARMBOARD VELCRO TS-30

## (undated) DEVICE — HANDPIECE INTERPULSE W MULTI TIP

## (undated) DEVICE — BLADE SURGICAL #10 STAINLESS

## (undated) DEVICE — DRAPE STERI-DRAPE INCISE 23X23"

## (undated) DEVICE — SOLIDIFIER CANN EXPRESS 3K

## (undated) DEVICE — SUT POLYSORB 1 27" GS-12 UNDYED

## (undated) DEVICE — WARMING BLANKET UPPER ADULT

## (undated) DEVICE — GOWN TRIMAX XXL

## (undated) DEVICE — DRSG WEBRIL 6"

## (undated) DEVICE — DRAPE IOBAN 23" X 23"

## (undated) DEVICE — VENODYNE/SCD SLEEVE CALF MEDIUM

## (undated) DEVICE — DRSG SILVERLON ISLAND 4X6" 2X4" PAD

## (undated) DEVICE — BLADE SURGICAL #15 CARBON

## (undated) DEVICE — SOL IRR POUR NS 0.9% 500ML

## (undated) DEVICE — SYR LUER LOK 20CC